# Patient Record
Sex: FEMALE | Race: WHITE | Employment: UNEMPLOYED | URBAN - METROPOLITAN AREA
[De-identification: names, ages, dates, MRNs, and addresses within clinical notes are randomized per-mention and may not be internally consistent; named-entity substitution may affect disease eponyms.]

---

## 2020-02-07 ENCOUNTER — HOSPITAL ENCOUNTER (EMERGENCY)
Facility: HOSPITAL | Age: 50
Discharge: HOME | End: 2020-02-08
Attending: EMERGENCY MEDICINE
Payer: COMMERCIAL

## 2020-02-07 ENCOUNTER — APPOINTMENT (EMERGENCY)
Dept: RADIOLOGY | Facility: HOSPITAL | Age: 50
End: 2020-02-07
Attending: EMERGENCY MEDICINE
Payer: COMMERCIAL

## 2020-02-07 VITALS
WEIGHT: 180 LBS | SYSTOLIC BLOOD PRESSURE: 93 MMHG | RESPIRATION RATE: 20 BRPM | HEART RATE: 60 BPM | HEIGHT: 62 IN | TEMPERATURE: 98.4 F | OXYGEN SATURATION: 95 % | BODY MASS INDEX: 33.13 KG/M2 | DIASTOLIC BLOOD PRESSURE: 49 MMHG

## 2020-02-07 DIAGNOSIS — S30.1XXA CONTUSION OF ABDOMINAL WALL, INITIAL ENCOUNTER: ICD-10-CM

## 2020-02-07 DIAGNOSIS — Z04.1 EXAM FOLLOWING MVC (MOTOR VEHICLE COLLISION), NO APPARENT INJURY: Primary | ICD-10-CM

## 2020-02-07 PROCEDURE — 96361 HYDRATE IV INFUSION ADD-ON: CPT

## 2020-02-07 PROCEDURE — 63600105 HC IODINE BASED CONTRAST: Performed by: EMERGENCY MEDICINE

## 2020-02-07 PROCEDURE — 3E0337Z INTRODUCTION OF ELECTROLYTIC AND WATER BALANCE SUBSTANCE INTO PERIPHERAL VEIN, PERCUTANEOUS APPROACH: ICD-10-PCS | Performed by: EMERGENCY MEDICINE

## 2020-02-07 PROCEDURE — 96374 THER/PROPH/DIAG INJ IV PUSH: CPT

## 2020-02-07 PROCEDURE — 73060 X-RAY EXAM OF HUMERUS: CPT | Mod: LT

## 2020-02-07 PROCEDURE — 3E0333Z INTRODUCTION OF ANTI-INFLAMMATORY INTO PERIPHERAL VEIN, PERCUTANEOUS APPROACH: ICD-10-PCS | Performed by: EMERGENCY MEDICINE

## 2020-02-07 PROCEDURE — 99285 EMERGENCY DEPT VISIT HI MDM: CPT | Mod: 25

## 2020-02-07 PROCEDURE — 73130 X-RAY EXAM OF HAND: CPT | Mod: LT

## 2020-02-07 PROCEDURE — 25800000 HC PHARMACY IV SOLUTIONS: Performed by: EMERGENCY MEDICINE

## 2020-02-07 PROCEDURE — 96375 TX/PRO/DX INJ NEW DRUG ADDON: CPT | Mod: 59

## 2020-02-07 PROCEDURE — 63600000 HC DRUGS/DETAIL CODE: Performed by: EMERGENCY MEDICINE

## 2020-02-07 PROCEDURE — 73090 X-RAY EXAM OF FOREARM: CPT | Mod: LT

## 2020-02-07 PROCEDURE — 3E033GC INTRODUCTION OF OTHER THERAPEUTIC SUBSTANCE INTO PERIPHERAL VEIN, PERCUTANEOUS APPROACH: ICD-10-PCS | Performed by: EMERGENCY MEDICINE

## 2020-02-07 PROCEDURE — 71260 CT THORAX DX C+: CPT

## 2020-02-07 PROCEDURE — 74177 CT ABD & PELVIS W/CONTRAST: CPT

## 2020-02-07 RX ORDER — ESCITALOPRAM OXALATE 10 MG/1
TABLET ORAL DAILY
COMMUNITY
End: 2023-02-01 | Stop reason: ALTCHOICE

## 2020-02-07 RX ORDER — SODIUM CHLORIDE 9 MG/ML
126 INJECTION, SOLUTION INTRAVENOUS CONTINUOUS
Status: DISCONTINUED | OUTPATIENT
Start: 2020-02-07 | End: 2020-02-08 | Stop reason: HOSPADM

## 2020-02-07 RX ORDER — DIPHENHYDRAMINE HCL 50 MG/ML
50 VIAL (ML) INJECTION ONCE
Status: COMPLETED | OUTPATIENT
Start: 2020-02-07 | End: 2020-02-07

## 2020-02-07 RX ADMIN — IOHEXOL 120 ML: 300 INJECTION, SOLUTION INTRAVENOUS at 23:02

## 2020-02-07 RX ADMIN — HYDROCORTISONE SODIUM SUCCINATE 100 MG: 100 INJECTION, POWDER, FOR SOLUTION INTRAMUSCULAR; INTRAVENOUS at 21:04

## 2020-02-07 RX ADMIN — SODIUM CHLORIDE 1000 ML: 9 INJECTION, SOLUTION INTRAVENOUS at 20:56

## 2020-02-07 RX ADMIN — DIPHENHYDRAMINE HYDROCHLORIDE 50 MG: 50 INJECTION INTRAMUSCULAR; INTRAVENOUS at 21:06

## 2020-02-07 SDOH — HEALTH STABILITY: MENTAL HEALTH: HOW OFTEN DO YOU HAVE A DRINK CONTAINING ALCOHOL?: MONTHLY OR LESS

## 2020-02-07 ASSESSMENT — ENCOUNTER SYMPTOMS
DIFFICULTY URINATING: 0
COUGH: 0
DYSURIA: 0
NAUSEA: 0
FACIAL SWELLING: 0
PALPITATIONS: 0
TROUBLE SWALLOWING: 0
BACK PAIN: 1
HEADACHES: 1
WHEEZING: 0
SHORTNESS OF BREATH: 1
AGITATION: 0
NUMBNESS: 0
DIAPHORESIS: 0
SEIZURES: 0
FATIGUE: 0
FEVER: 0
SPEECH DIFFICULTY: 0
LIGHT-HEADEDNESS: 1
NECK PAIN: 1
APPETITE CHANGE: 0
ABDOMINAL PAIN: 1
ACTIVITY CHANGE: 0
COLOR CHANGE: 0
SORE THROAT: 0
VOMITING: 0
WEAKNESS: 0
DIZZINESS: 0
DIARRHEA: 0
FLANK PAIN: 0
MYALGIAS: 1
CHILLS: 0

## 2020-02-08 RX ORDER — IBUPROFEN 600 MG/1
600 TABLET ORAL EVERY 6 HOURS PRN
Qty: 20 TABLET | Refills: 0 | Status: SHIPPED | OUTPATIENT
Start: 2020-02-08 | End: 2023-02-01 | Stop reason: ALTCHOICE
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
## 2023-02-01 VITALS
WEIGHT: 173 LBS | BODY MASS INDEX: 31.83 KG/M2 | HEIGHT: 62 IN | HEART RATE: 77 BPM | OXYGEN SATURATION: 97 % | RESPIRATION RATE: 18 BRPM | SYSTOLIC BLOOD PRESSURE: 147 MMHG | DIASTOLIC BLOOD PRESSURE: 85 MMHG | TEMPERATURE: 96.4 F

## 2023-02-01 LAB
ALBUMIN SERPL-MCNC: 4.4 G/DL (ref 3.4–5)
ALP SERPL-CCNC: 70 IU/L (ref 35–126)
ALT SERPL-CCNC: 19 IU/L (ref 11–54)
ANION GAP SERPL CALC-SCNC: 9 MEQ/L (ref 3–15)
AST SERPL-CCNC: 20 IU/L (ref 15–41)
BASOPHILS # BLD: 0.06 K/UL (ref 0.01–0.1)
BASOPHILS NFR BLD: 0.8 %
BILIRUB SERPL-MCNC: 1 MG/DL (ref 0.3–1.2)
BUN SERPL-MCNC: 15 MG/DL (ref 8–20)
CALCIUM SERPL-MCNC: 9.2 MG/DL (ref 8.9–10.3)
CHLORIDE SERPL-SCNC: 103 MEQ/L (ref 98–109)
CO2 SERPL-SCNC: 25 MEQ/L (ref 22–32)
CREAT SERPL-MCNC: 0.6 MG/DL (ref 0.6–1.1)
DIFFERENTIAL METHOD BLD: NORMAL
EOSINOPHIL # BLD: 0.04 K/UL (ref 0.04–0.36)
EOSINOPHIL NFR BLD: 0.5 %
ERYTHROCYTE [DISTWIDTH] IN BLOOD BY AUTOMATED COUNT: 12.8 % (ref 11.7–14.4)
GFR SERPL CREATININE-BSD FRML MDRD: >60 ML/MIN/1.73M*2
GLUCOSE SERPL-MCNC: 104 MG/DL (ref 70–99)
HCT VFR BLDCO AUTO: 41.2 % (ref 35–45)
HGB BLD-MCNC: 13.8 G/DL (ref 11.8–15.7)
IMM GRANULOCYTES # BLD AUTO: 0.02 K/UL (ref 0–0.08)
IMM GRANULOCYTES NFR BLD AUTO: 0.3 %
LYMPHOCYTES # BLD: 3.09 K/UL (ref 1.2–3.5)
LYMPHOCYTES NFR BLD: 40 %
MCH RBC QN AUTO: 29.6 PG (ref 28–33.2)
MCHC RBC AUTO-ENTMCNC: 33.5 G/DL (ref 32.2–35.5)
MCV RBC AUTO: 88.2 FL (ref 83–98)
MONOCYTES # BLD: 0.57 K/UL (ref 0.28–0.8)
MONOCYTES NFR BLD: 7.4 %
NEUTROPHILS # BLD: 3.95 K/UL (ref 1.7–7)
NEUTS SEG NFR BLD: 51 %
NRBC BLD-RTO: 0 %
PDW BLD AUTO: 9.4 FL (ref 9.4–12.3)
PLATELET # BLD AUTO: 308 K/UL (ref 150–369)
POTASSIUM SERPL-SCNC: 3.6 MEQ/L (ref 3.6–5.1)
PROT SERPL-MCNC: 7.9 G/DL (ref 6–8.2)
RBC # BLD AUTO: 4.67 M/UL (ref 3.93–5.22)
SODIUM SERPL-SCNC: 137 MEQ/L (ref 136–144)
WBC # BLD AUTO: 7.73 K/UL (ref 3.8–10.5)

## 2023-02-01 PROCEDURE — 82040 ASSAY OF SERUM ALBUMIN: CPT

## 2023-02-01 PROCEDURE — 80053 COMPREHEN METABOLIC PANEL: CPT | Performed by: EMERGENCY MEDICINE

## 2023-02-01 PROCEDURE — 99284 EMERGENCY DEPT VISIT MOD MDM: CPT

## 2023-02-01 PROCEDURE — 36415 COLL VENOUS BLD VENIPUNCTURE: CPT

## 2023-02-01 PROCEDURE — 85025 COMPLETE CBC W/AUTO DIFF WBC: CPT

## 2023-02-01 PROCEDURE — 83690 ASSAY OF LIPASE: CPT | Performed by: PHYSICIAN ASSISTANT

## 2023-02-01 PROCEDURE — 3E033NZ INTRODUCTION OF ANALGESICS, HYPNOTICS, SEDATIVES INTO PERIPHERAL VEIN, PERCUTANEOUS APPROACH: ICD-10-PCS | Performed by: EMERGENCY MEDICINE

## 2023-02-01 PROCEDURE — 81003 URINALYSIS AUTO W/O SCOPE: CPT | Performed by: EMERGENCY MEDICINE

## 2023-02-01 PROCEDURE — 3E033GC INTRODUCTION OF OTHER THERAPEUTIC SUBSTANCE INTO PERIPHERAL VEIN, PERCUTANEOUS APPROACH: ICD-10-PCS | Performed by: EMERGENCY MEDICINE

## 2023-02-01 PROCEDURE — 85025 COMPLETE CBC W/AUTO DIFF WBC: CPT | Performed by: EMERGENCY MEDICINE

## 2023-02-01 PROCEDURE — 96375 TX/PRO/DX INJ NEW DRUG ADDON: CPT | Mod: 25

## 2023-02-01 PROCEDURE — 81003 URINALYSIS AUTO W/O SCOPE: CPT

## 2023-02-01 PROCEDURE — 96374 THER/PROPH/DIAG INJ IV PUSH: CPT

## 2023-02-02 ENCOUNTER — HOSPITAL ENCOUNTER (EMERGENCY)
Facility: HOSPITAL | Age: 53
Discharge: HOME | End: 2023-02-02
Attending: EMERGENCY MEDICINE
Payer: COMMERCIAL

## 2023-02-02 ENCOUNTER — APPOINTMENT (EMERGENCY)
Dept: RADIOLOGY | Facility: HOSPITAL | Age: 53
End: 2023-02-02
Payer: COMMERCIAL

## 2023-02-02 DIAGNOSIS — K57.90 DIVERTICULOSIS: ICD-10-CM

## 2023-02-02 DIAGNOSIS — K42.9 UMBILICAL HERNIA WITHOUT OBSTRUCTION AND WITHOUT GANGRENE: ICD-10-CM

## 2023-02-02 DIAGNOSIS — R10.9 ABDOMINAL PAIN, UNSPECIFIED ABDOMINAL LOCATION: Primary | ICD-10-CM

## 2023-02-02 DIAGNOSIS — K59.00 CONSTIPATION, UNSPECIFIED CONSTIPATION TYPE: ICD-10-CM

## 2023-02-02 LAB
BILIRUB UR QL STRIP.AUTO: NEGATIVE MG/DL
CLARITY UR REFRACT.AUTO: CLEAR
COLOR UR AUTO: COLORLESS
GLUCOSE UR STRIP.AUTO-MCNC: NEGATIVE MG/DL
HGB UR QL STRIP.AUTO: NEGATIVE
KETONES UR STRIP.AUTO-MCNC: NEGATIVE MG/DL
LEUKOCYTE ESTERASE UR QL STRIP.AUTO: NEGATIVE
LIPASE SERPL-CCNC: 35 U/L (ref 20–51)
NITRITE UR QL STRIP.AUTO: NEGATIVE
PH UR STRIP.AUTO: 5.5 [PH]
PROT UR QL STRIP.AUTO: NEGATIVE
SP GR UR REFRACT.AUTO: 1.01
UROBILINOGEN UR STRIP-ACNC: 0.2 EU/DL

## 2023-02-02 PROCEDURE — 63700000 HC SELF-ADMINISTRABLE DRUG: Performed by: EMERGENCY MEDICINE

## 2023-02-02 PROCEDURE — G1004 CDSM NDSC: HCPCS

## 2023-02-02 PROCEDURE — 25800000 HC PHARMACY IV SOLUTIONS: Performed by: PHYSICIAN ASSISTANT

## 2023-02-02 PROCEDURE — 63600000 HC DRUGS/DETAIL CODE: Performed by: PHYSICIAN ASSISTANT

## 2023-02-02 PROCEDURE — 74176 CT ABD & PELVIS W/O CONTRAST: CPT | Mod: ME

## 2023-02-02 RX ORDER — DICYCLOMINE HYDROCHLORIDE 10 MG/1
10 CAPSULE ORAL ONCE
Status: COMPLETED | OUTPATIENT
Start: 2023-02-02 | End: 2023-02-02

## 2023-02-02 RX ORDER — ONDANSETRON HYDROCHLORIDE 2 MG/ML
4 INJECTION, SOLUTION INTRAVENOUS ONCE
Status: COMPLETED | OUTPATIENT
Start: 2023-02-02 | End: 2023-02-02

## 2023-02-02 RX ORDER — POLYETHYLENE GLYCOL 3350 17 G/17G
17 POWDER, FOR SOLUTION ORAL ONCE
Status: COMPLETED | OUTPATIENT
Start: 2023-02-02 | End: 2023-02-02

## 2023-02-02 RX ORDER — MORPHINE SULFATE 4 MG/ML
4 INJECTION, SOLUTION INTRAMUSCULAR; INTRAVENOUS ONCE
Status: COMPLETED | OUTPATIENT
Start: 2023-02-02 | End: 2023-02-02

## 2023-02-02 RX ADMIN — DICYCLOMINE HYDROCHLORIDE 10 MG: 10 CAPSULE ORAL at 05:15

## 2023-02-02 RX ADMIN — ONDANSETRON HYDROCHLORIDE 4 MG: 2 SOLUTION INTRAMUSCULAR; INTRAVENOUS at 03:10

## 2023-02-02 RX ADMIN — SODIUM CHLORIDE 1000 ML: 9 INJECTION, SOLUTION INTRAVENOUS at 03:10

## 2023-02-02 RX ADMIN — MORPHINE SULFATE 4 MG: 4 INJECTION, SOLUTION INTRAMUSCULAR; INTRAVENOUS at 03:10

## 2023-02-02 RX ADMIN — POLYETHYLENE GLYCOL (3350) 17 G: 17 POWDER, FOR SOLUTION ORAL at 05:15

## 2023-02-02 NOTE — DISCHARGE INSTRUCTIONS
Take OTC Miralax, as directed on the bottle, 1-2 times per day until having a large bowel movement    Make your PCP aware of incidental findings including a small splenule

## 2023-02-02 NOTE — ED ATTESTATION NOTE
Procedures  Physical Exam  Review of Systems    :26 AM  I have personally seen and examined the patient.  I personally performed the key components of the encounter and provided a substantive portion of the care and medical decision making for this patient. I reviewed and agree with the PA/NP/Resident's assessment and plan of care, with any exceptions as documented below    My focused history, examination, assessment, and plan of care of Sofia Glass is as follows:    The patient is a 52 y.o. who presents with left sided abdominal pain, now right sided in nature, severe, non radiating at this time, no associated fevers, chills, nausea or vomiting, no urinary or vaginal symptoms    Pertinent past medical history includes: pneumonia      Exam: mild tenderness to deep palpation with palpable stool burden in transverse and descending colon      Impression/Plan/Medical decision [age] year old female here with abdominal pain, subsequently underwent CT imaging to help further evaluate pain, no clear picture for source of pain without other localizing sources by system for the patient's pain, no significant diarrhea or other issues. Patient with some issues with constipation however, and CT imaging also reveals a significant stool burden, with the negative other workup, I think this most likely reflects issues with more pronounced constipation, patient subsequently given symptomatic control and a bowel regimen and is to follow up with her PCP and return with any new or worsening symptoms        Vital Signs Review: Vital signs have been reviewed. The oxygen saturation is SpO2: 97 %  which is normal.      I was physically present for the key/critical portions of the following procedures: None    This document was created using dragon dictation software.  There might be some typographical errors due to this technology.    Patients seen in waiting room secondary to significantly increased volumes, decreased  capacity and staff.      Lambert Macias MD  02/04/23 8053

## 2023-02-03 ASSESSMENT — ENCOUNTER SYMPTOMS
DYSURIA: 0
DIARRHEA: 0
FREQUENCY: 0
CONSTIPATION: 0
FEVER: 0
HEMATURIA: 0
ABDOMINAL PAIN: 1
VOMITING: 0
NAUSEA: 0
CHILLS: 0

## 2023-02-04 NOTE — ED PROVIDER NOTES
Emergency Medicine Note  HPI   HISTORY OF PRESENT ILLNESS     52-year-old female with history of pneumonia, cholecystectomy, appendectomy, kidney stones, presents to the emergency department for right-sided abdominal pain.  Patient reports several weeks ago she started with left-sided abdominal pain, then started with right-sided abdominal pain, sometimes has pain in both areas, but today the pain in the right side was severe.  Patient states she thinks she could be constipated, but did have a normal bowel movement today.  It was not black or bloody.  Denies fever, chills, nausea, vomiting, urinary symptoms.  Patient has never had pain like this before.            Patient History   PAST HISTORY     Reviewed from Nursing Triage:       Past Medical History:   Diagnosis Date   • Pneumonia        History reviewed. No pertinent surgical history.    History reviewed. No pertinent family history.    Social History     Tobacco Use   • Smoking status: Never   • Smokeless tobacco: Never   Substance Use Topics   • Alcohol use: Yes   • Drug use: Never         Review of Systems   REVIEW OF SYSTEMS     Review of Systems   Constitutional: Negative for chills and fever.   Gastrointestinal: Positive for abdominal pain. Negative for constipation, diarrhea, nausea and vomiting.   Genitourinary: Negative for dysuria, frequency, hematuria and urgency.         VITALS     ED Vitals    Date/Time Temp Pulse Resp BP SpO2 [facility]   02/01/23 2252 35.8 °C (96.4 °F) 77 18 147/85 97 % HAB                       Physical Exam   PHYSICAL EXAM     Physical Exam  Vitals and nursing note reviewed.   Constitutional:       General: She is in acute distress (Appears uncomfortable).      Appearance: Normal appearance. She is not ill-appearing, toxic-appearing or diaphoretic.   HENT:      Head: Normocephalic and atraumatic.   Cardiovascular:      Rate and Rhythm: Normal rate and regular rhythm.      Heart sounds: Normal heart sounds.   Pulmonary:      Effort:  Pulmonary effort is normal. No respiratory distress.      Breath sounds: Normal breath sounds. No stridor. No wheezing, rhonchi or rales.   Chest:      Chest wall: No tenderness.   Abdominal:      General: There is no distension.      Palpations: Abdomen is soft.      Tenderness: There is no abdominal tenderness. There is no right CVA tenderness or left CVA tenderness.   Musculoskeletal:         General: Normal range of motion.   Skin:     General: Skin is warm and dry.   Neurological:      Mental Status: She is alert and oriented to person, place, and time.   Psychiatric:         Mood and Affect: Mood normal.         Behavior: Behavior normal.           PROCEDURES     Procedures     DATA     Results     Procedure Component Value Units Date/Time    Extra Tubes [[id number]] Collected: 02/01/23 2256    Specimen: Blood, Venous Updated: 02/02/23 0701    Narrative:      The following orders were created for panel order Extra Tubes.  Procedure                               Abnormality         Status                     ---------                               -----------         ------                     Extra Tube Lt Blue[[id number]]                               Final result               Extra Tube Red[[id number]]                                   Final result               Extra Tube Lt Green[[id number]]                              Final result               Extra Tube Gold[[id number]]                                  Final result                 Please view results for these tests on the individual orders.    Extra Tube Lt Blue [[id number]] Collected: 02/01/23 2256    Specimen: Blood, Venous Updated: 02/02/23 0701    Extra Tube Red [[id number]] Collected: 02/01/23 2256    Specimen: Blood, Venous Updated: 02/02/23 0701    Extra Tube Lt Green [[id number]] Collected: 02/01/23 2256    Specimen: Blood, Venous Updated: 02/02/23 0701    Extra Tube Gold [[id number]] Collected: 02/01/23 2256    Specimen: Blood, Venous Updated:  02/02/23 0701    Lipase [[id number]]  (Normal) Collected: 02/01/23 2256    Specimen: Blood, Venous Updated: 02/02/23 0322     Lipase 35 U/L     Urinalysis with Reflex Culture [[id number]]  (Normal) Collected: 02/01/23 2358    Specimen: Urine, Clean Catch Updated: 02/02/23 0017    Narrative:      The following orders were created for panel order Urinalysis with Reflex Culture.  Procedure                               Abnormality         Status                     ---------                               -----------         ------                     UA Reflex to Culture (Ma...[[id number]]  Normal              Final result                 Please view results for these tests on the individual orders.    UA Reflex to Culture (Macroscopic) [[id number]]  (Normal) Collected: 02/01/23 2358    Specimen: Urine, Clean Catch Updated: 02/02/23 0017     Color, Urine Colorless     Clarity, Urine Clear     Specific Gravity, Urine 1.013     pH, Urine 5.5     Leukocyte Esterase Negative     Comment: Results can be falsely negative due to high specific gravity, some antibiotics, glucose >3 g/dl, or WBC other than neutrophils.        Nitrite, Urine Negative     Protein, Urine Negative     Glucose, Urine Negative mg/dL      Ketones, Urine Negative mg/dL      Urobilinogen, Urine 0.2 EU/dL      Bilirubin, Urine Negative mg/dL      Blood, Urine Negative     Comment: The sensitivity of the occult blood test is equivalent to approximately 4 intact RBC/HPF.       Comprehensive metabolic panel [[id number]]  (Abnormal) Collected: 02/01/23 2256    Specimen: Blood, Venous Updated: 02/01/23 2322     Sodium 137 mEQ/L      Potassium 3.6 mEQ/L      Comment: Results obtained on plasma. Plasma Potassium values may be up to 0.4 mEQ/L less than serum values. The differences may be greater for patients with high platelet or white cell counts.        Chloride 103 mEQ/L      CO2 25 mEQ/L      BUN 15 mg/dL      Creatinine 0.6 mg/dL      Glucose 104 mg/dL       Calcium 9.2 mg/dL      AST (SGOT) 20 IU/L      ALT (SGPT) 19 IU/L      Alkaline Phosphatase 70 IU/L      Total Protein 7.9 g/dL      Comment: Test performed on plasma which typically contains approximately 0.4 g/dL more protein than serum.        Albumin 4.4 g/dL      Bilirubin, Total 1.0 mg/dL      eGFR >60.0 mL/min/1.73m*2      Anion Gap 9 mEQ/L     CBC and differential [[id number]] Collected: 02/01/23 2256    Specimen: Blood, Venous Updated: 02/01/23 2306     WBC 7.73 K/uL      RBC 4.67 M/uL      Hemoglobin 13.8 g/dL      Hematocrit 41.2 %      MCV 88.2 fL      MCH 29.6 pg      MCHC 33.5 g/dL      RDW 12.8 %      Platelets 308 K/uL      MPV 9.4 fL      Differential Type Auto     nRBC 0.0 %      Immature Granulocytes 0.3 %      Neutrophils 51.0 %      Lymphocytes 40.0 %      Monocytes 7.4 %      Eosinophils 0.5 %      Basophils 0.8 %      Immature Granulocytes, Absolute 0.02 K/uL      Neutrophils, Absolute 3.95 K/uL      Lymphocytes, Absolute 3.09 K/uL      Monocytes, Absolute 0.57 K/uL      Eosinophils, Absolute 0.04 K/uL      Basophils, Absolute 0.06 K/uL           Imaging Results          CT ABDOMEN PELVIS WITHOUT IV CONTRAST (Final result)  Result time 02/02/23 08:47:40    Final result                 Impression:    IMPRESSION: No hydronephrosis. No hydroureter. Cholecystectomy clips.               Narrative:    CLINICAL HISTORY: RUQ Abdominal Pain  RLQ abdominal pain (Age >= 14y)    COMMENT: Serial axial images of the abdomen and pelvis without contrast with  sagittal and coronal reformats. The study is limited due to lack of contrast for  evaluation of solid organs and bowel.    COMPARISON: February 7, 2020.    CT DOSE:  One or more dose reduction techniques (e.g. automated exposure  control, adjustment of the mA and/or kV according to patient size, use of  iterative reconstruction technique) utilized for this examination.    LUNG BASES/HEART :  Normal.    LIVER//BILIARY TREE:  No acute findings.  GALL  BLADDER:Patient is status post cholecystectomy..  SPLEEN:  Normal.  PANCREAS:  No acute findings.  ADRENAL GLANDS:  Normal.  KIDNEYS:  Kidneys are normal.  GI TRACT:  There is no focal inflammatory process or mural thickening.  Diverticulosis coli present.  VESSELS:No aneurysm.  PELVIC STRUCTURES: No pelvic masses.  ADENOPATHY: None.  ASCITES: None.  OSSEOUS STRUCTURES: No acute osseous findings.. Mild degenerative changes in the  lumbar spine.  Small fat-containing umbilical hernia.                  ED Interpretation    Preliminary Impression:    Comparison was made with prior study dated 2/7/20. Exam is limited without IV contrast. Status post cholecystectomy. Small splenule No evidence for renal calculi or hydronephrosis. No evidence of bowel obstruction. The appendix is not visualized but no evidence for pericecal inflammation. Colonic diverticulosis without evidence for acute diverticulitis. Small fat-containing umbilical hernia.      Interpreted by: Mey Torre MD, Feb 02, 2023 04:13 AM                              No orders to display       Scoring tools                                  ED Course & MDM   MDM / ED COURSE / CLINICAL IMPRESSION / DISPO     MDM    ED Course as of 02/03/23 2340   Thu Feb 02, 2023   0558 Patient seen for right-sided mid abdominal pain.  UA negative.  Labs unremarkable.  CAT scan unremarkable, showing other incidental findings that were relayed to the patient.  I viewed the CAT scan personally and patient does have a moderate to large amount of stool on the right side.  Patient was originally treated with fluids, morphine, Zofran when she first got here.  She was seen by Dr. Guerra who ordered Bentyl and MiraLAX.  We will discharge patient home to continue to take MiraLAX. [MG]      ED Course User Index  [MG] Mckenzie Burns PA C     Clinical Impression      Abdominal pain, unspecified abdominal location   Constipation, unspecified constipation type   Diverticulosis    Umbilical hernia without obstruction and without gangrene     _________________     ED Disposition   Discharge                   Mckenzie Burns PA C  02/03/23 2245

## 2023-06-20 ENCOUNTER — TRANSCRIBE ORDERS (OUTPATIENT)
Dept: SCHEDULING | Age: 53
End: 2023-06-20

## 2023-06-20 DIAGNOSIS — S92.002A UNSPECIFIED FRACTURE OF LEFT CALCANEUS, INITIAL ENCOUNTER FOR CLOSED FRACTURE: Primary | ICD-10-CM

## 2023-06-30 ENCOUNTER — HOSPITAL ENCOUNTER (OUTPATIENT)
Dept: RADIOLOGY | Facility: HOSPITAL | Age: 53
Discharge: HOME | End: 2023-06-30
Attending: PODIATRIST
Payer: COMMERCIAL

## 2023-06-30 DIAGNOSIS — S92.002A UNSPECIFIED FRACTURE OF LEFT CALCANEUS, INITIAL ENCOUNTER FOR CLOSED FRACTURE: ICD-10-CM

## 2023-09-14 ENCOUNTER — APPOINTMENT (OUTPATIENT)
Dept: PREADMISSION TESTING | Facility: HOSPITAL | Age: 53
End: 2023-09-14
Payer: COMMERCIAL

## 2023-09-14 VITALS — HEIGHT: 61 IN | BODY MASS INDEX: 32.1 KG/M2 | WEIGHT: 170 LBS

## 2023-09-14 RX ORDER — CETIRIZINE HYDROCHLORIDE 10 MG/1
10 TABLET ORAL DAILY PRN
COMMUNITY
Start: 2023-09-02

## 2023-09-14 RX ORDER — DIPHENHYDRAMINE HCL 25 MG
25 CAPSULE ORAL NIGHTLY PRN
COMMUNITY

## 2023-09-14 RX ORDER — LORAZEPAM 1 MG/1
1 TABLET ORAL EVERY 6 HOURS PRN
COMMUNITY

## 2023-09-14 RX ORDER — TRAMADOL HYDROCHLORIDE 50 MG/1
50 TABLET ORAL EVERY 12 HOURS PRN
COMMUNITY
Start: 2023-08-09

## 2023-09-14 RX ORDER — MELOXICAM 15 MG/1
15 TABLET ORAL AS NEEDED
COMMUNITY
Start: 2022-06-02

## 2023-09-14 RX ORDER — ALBUTEROL SULFATE 90 UG/1
2 INHALANT RESPIRATORY (INHALATION) EVERY 4 HOURS PRN
COMMUNITY
Start: 2023-06-30

## 2023-09-14 RX ORDER — EZETIMIBE 10 MG/1
10 TABLET ORAL DAILY
COMMUNITY
Start: 2023-09-05 | End: 2023-12-04

## 2023-09-14 ASSESSMENT — PAIN SCALES - GENERAL: PAINLEVEL: 3

## 2023-09-14 NOTE — PRE-PROCEDURE INSTRUCTIONS
1. Admissions will call you with your arrival time on 9/20 (day prior to surgery) between 2pm-4pm.  For questions about your arrival time, please call [phone].     2. On the day of your procedure please report to the [facility] in the [place]. Please arrive through the [facility].  If you are parking in the [facility] [place] [facility], come to the ground floor of the garage and follow signs to the [facility].  After being screened, please report to either the Outpatient Registration for Pre-Admission Testing or to the Surgery Registration Desk.    3. Please follow the following fasting guidelines:     No solid food EIGHT HOURS prior to arrival time on day of surgery.  Unlimited clear liquids, meaning water or PLAIN black coffee WITHOUT any milk, cream, sugar, or sweetener are permitted up to TWO HOURS prior to arrival at the hospital.    4. Please take ONLY the following medications with a sip of water on the morning of your procedure: (populate names and/or NONE)  Bring in Albuterol, Ativan if needed    5. Other Instructions: You may brush your teeth the morning of the procedure. Rinse and spit, do not swallow.  Bring a list of your medications with dosages.  Use surgical wash as directed.     6. If you develop a cold, cough, fever, rash, or other symptom prior to the day of the procedure, please report it to your physician immediately.    7. If you need to cancel the procedure for any reason, please contact your physician.    8. Make arrangements to have safe transportation home accompanied by a responsible adult. If you have not arranged safe transportation home, your surgery will be cancelled. Safe transportation may include private vehicle, ride-share service, taxi and public transportation when accompanied by a responsible adult who will assist you home. A responsible adult is someone known to you and does not include the taxi, ride-share or public transit drive transporting  you.    9.  If it is medically necessary for you to have a longer stay, you will be informed as soon as the decision is made.    10. Only bring essential items to the hospital.  Do not wear or bring anything of value to the hospital including jewelry of any kind, money, or wallet. Do not wear make-up or contact lenses.  DO NOT BRING MEDICATIONS FROM HOME unless instructed to do so. DO bring your hearing aids, glasses, and a case    11. No lotion, creams, powders, or oils on skin the morning of procedure     12. Dress in comfortable clothes.    13.  If instructed, please bring a copy of your Advanced Directive (Living Will/Durable Power of ) on the day of your procedure.     14. Ensuring your safety at all times is a very important part of our [facility] Culture of Safety. After having surgery and sedation, you are at risk for falling and balance issues. Although you may feel awake, the effects of the medication can last up to 24 hours after anesthesia. If you need to use the bathroom during your recovery period, nursing staff will escort you there and stay with you to ensure your safety.    15. Refrain from drinking alcohol and smoking cigarettes for 24 hours prior to surgery.    16. Shower with antibacterial soap (Dial) the night before and morning of your procedure.  If your procedure indicates the need for CHG antiseptic wash (Bactoshield or Hibiclens), please use this instead and follow instructions as discussed at the time of your Pre-Admission Testing phone interview or visit.    Above instructions reviewed with patient and patient acknowledges understanding.    Form explained by: Kathy Ely RN

## 2023-09-21 ENCOUNTER — HOSPITAL ENCOUNTER (OUTPATIENT)
Facility: HOSPITAL | Age: 53
Setting detail: HOSPITAL OUTPATIENT SURGERY
Discharge: HOME | End: 2023-09-21
Attending: PODIATRIST | Admitting: PODIATRIST
Payer: COMMERCIAL

## 2023-09-21 ENCOUNTER — ANESTHESIA (OUTPATIENT)
Dept: OPERATING ROOM | Facility: HOSPITAL | Age: 53
Setting detail: HOSPITAL OUTPATIENT SURGERY
End: 2023-09-21
Payer: COMMERCIAL

## 2023-09-21 VITALS
OXYGEN SATURATION: 99 % | HEART RATE: 60 BPM | RESPIRATION RATE: 17 BRPM | TEMPERATURE: 97.2 F | SYSTOLIC BLOOD PRESSURE: 106 MMHG | DIASTOLIC BLOOD PRESSURE: 64 MMHG

## 2023-09-21 PROCEDURE — 63700000 HC SELF-ADMINISTRABLE DRUG

## 2023-09-21 PROCEDURE — 71000012 HC PACU PHASE 2 EA ADDL MIN: Performed by: PODIATRIST

## 2023-09-21 PROCEDURE — 25000000 HC PHARMACY GENERAL: Performed by: REGISTERED NURSE

## 2023-09-21 PROCEDURE — 63600000 HC DRUGS/DETAIL CODE: Mod: JZ | Performed by: REGISTERED NURSE

## 2023-09-21 PROCEDURE — 36000013 HC OR LEVEL 3 EA ADDL MIN: Performed by: PODIATRIST

## 2023-09-21 PROCEDURE — 25000000 HC PHARMACY GENERAL: Performed by: PODIATRIST

## 2023-09-21 PROCEDURE — 0J8R0ZZ DIVISION OF LEFT FOOT SUBCUTANEOUS TISSUE AND FASCIA, OPEN APPROACH: ICD-10-PCS | Performed by: PODIATRIST

## 2023-09-21 PROCEDURE — 36000003 HC OR LEVEL 3 INITIAL 30MIN: Performed by: PODIATRIST

## 2023-09-21 PROCEDURE — 25800000 HC PHARMACY IV SOLUTIONS: Performed by: REGISTERED NURSE

## 2023-09-21 PROCEDURE — 27200000 HC STERILE SUPPLY: Performed by: PODIATRIST

## 2023-09-21 PROCEDURE — 37000001 HC ANESTHESIA GENERAL: Performed by: PODIATRIST

## 2023-09-21 PROCEDURE — 63600000 HC DRUGS/DETAIL CODE: Performed by: REGISTERED NURSE

## 2023-09-21 PROCEDURE — 71000002 HC PACU PHASE 2 INITIAL 30MIN: Performed by: PODIATRIST

## 2023-09-21 PROCEDURE — 71000001 HC PACU PHASE 1 INITIAL 30MIN: Performed by: PODIATRIST

## 2023-09-21 PROCEDURE — 63600000 HC DRUGS/DETAIL CODE: Mod: JZ | Performed by: PODIATRIST

## 2023-09-21 PROCEDURE — 63600000 HC DRUGS/DETAIL CODE: Mod: JZ

## 2023-09-21 PROCEDURE — 71000011 HC PACU PHASE 1 EA ADDL MIN: Performed by: PODIATRIST

## 2023-09-21 RX ORDER — FENTANYL CITRATE 50 UG/ML
INJECTION, SOLUTION INTRAMUSCULAR; INTRAVENOUS AS NEEDED
Status: DISCONTINUED | OUTPATIENT
Start: 2023-09-21 | End: 2023-09-21 | Stop reason: SURG

## 2023-09-21 RX ORDER — FENTANYL CITRATE 50 UG/ML
50 INJECTION, SOLUTION INTRAMUSCULAR; INTRAVENOUS EVERY 5 MIN PRN
Status: DISCONTINUED | OUTPATIENT
Start: 2023-09-21 | End: 2023-09-21 | Stop reason: HOSPADM

## 2023-09-21 RX ORDER — LIDOCAINE HYDROCHLORIDE 10 MG/ML
INJECTION, SOLUTION EPIDURAL; INFILTRATION; INTRACAUDAL; PERINEURAL AS NEEDED
Status: DISCONTINUED | OUTPATIENT
Start: 2023-09-21 | End: 2023-09-21 | Stop reason: SURG

## 2023-09-21 RX ORDER — IBUPROFEN 200 MG
16-32 TABLET ORAL AS NEEDED
Status: DISCONTINUED | OUTPATIENT
Start: 2023-09-21 | End: 2023-09-21 | Stop reason: HOSPADM

## 2023-09-21 RX ORDER — SODIUM CHLORIDE 9 MG/ML
INJECTION, SOLUTION INTRAVENOUS CONTINUOUS PRN
Status: DISCONTINUED | OUTPATIENT
Start: 2023-09-21 | End: 2023-09-21 | Stop reason: SURG

## 2023-09-21 RX ORDER — KETOROLAC TROMETHAMINE 15 MG/ML
15 INJECTION, SOLUTION INTRAMUSCULAR; INTRAVENOUS ONCE AS NEEDED
Status: CANCELLED | OUTPATIENT
Start: 2023-09-21 | End: 2023-09-22

## 2023-09-21 RX ORDER — BUPIVACAINE HYDROCHLORIDE 5 MG/ML
INJECTION, SOLUTION EPIDURAL; INTRACAUDAL
Status: DISCONTINUED | OUTPATIENT
Start: 2023-09-21 | End: 2023-09-21 | Stop reason: HOSPADM

## 2023-09-21 RX ORDER — DEXAMETHASONE SODIUM PHOSPHATE 4 MG/ML
INJECTION, SOLUTION INTRA-ARTICULAR; INTRALESIONAL; INTRAMUSCULAR; INTRAVENOUS; SOFT TISSUE
Status: DISCONTINUED | OUTPATIENT
Start: 2023-09-21 | End: 2023-09-21 | Stop reason: HOSPADM

## 2023-09-21 RX ORDER — DEXTROSE 40 %
15-30 GEL (GRAM) ORAL AS NEEDED
Status: DISCONTINUED | OUTPATIENT
Start: 2023-09-21 | End: 2023-09-21 | Stop reason: HOSPADM

## 2023-09-21 RX ORDER — HYDROCODONE BITARTRATE AND ACETAMINOPHEN 5; 325 MG/1; MG/1
1 TABLET ORAL EVERY 6 HOURS PRN
Qty: 10 TABLET | Refills: 0 | Status: SHIPPED | OUTPATIENT
Start: 2023-09-21 | End: 2024-01-04 | Stop reason: ALTCHOICE

## 2023-09-21 RX ORDER — HYDROCODONE BITARTRATE AND ACETAMINOPHEN 5; 325 MG/1; MG/1
1 TABLET ORAL ONCE AS NEEDED
Status: COMPLETED | OUTPATIENT
Start: 2023-09-21 | End: 2023-09-21

## 2023-09-21 RX ORDER — EPHEDRINE SULFATE 50 MG/ML
INJECTION, SOLUTION INTRAVENOUS AS NEEDED
Status: DISCONTINUED | OUTPATIENT
Start: 2023-09-21 | End: 2023-09-21 | Stop reason: SURG

## 2023-09-21 RX ORDER — HYDROCODONE BITARTRATE AND ACETAMINOPHEN 5; 325 MG/1; MG/1
TABLET ORAL
Status: COMPLETED
Start: 2023-09-21 | End: 2023-09-21

## 2023-09-21 RX ORDER — DEXTROSE 50 % IN WATER (D50W) INTRAVENOUS SYRINGE
25 AS NEEDED
Status: DISCONTINUED | OUTPATIENT
Start: 2023-09-21 | End: 2023-09-21 | Stop reason: HOSPADM

## 2023-09-21 RX ORDER — MELOXICAM 7.5 MG/1
7.5 TABLET ORAL DAILY
Qty: 30 TABLET | Refills: 0 | Status: SHIPPED | OUTPATIENT
Start: 2023-09-21 | End: 2023-10-21

## 2023-09-21 RX ORDER — ONDANSETRON HYDROCHLORIDE 2 MG/ML
4 INJECTION, SOLUTION INTRAVENOUS
Status: DISCONTINUED | OUTPATIENT
Start: 2023-09-21 | End: 2023-09-21 | Stop reason: HOSPADM

## 2023-09-21 RX ORDER — ONDANSETRON HYDROCHLORIDE 2 MG/ML
INJECTION, SOLUTION INTRAVENOUS AS NEEDED
Status: DISCONTINUED | OUTPATIENT
Start: 2023-09-21 | End: 2023-09-21 | Stop reason: SURG

## 2023-09-21 RX ORDER — PROPOFOL 10 MG/ML
INJECTION, EMULSION INTRAVENOUS AS NEEDED
Status: DISCONTINUED | OUTPATIENT
Start: 2023-09-21 | End: 2023-09-21 | Stop reason: SURG

## 2023-09-21 RX ORDER — MIDAZOLAM HYDROCHLORIDE 2 MG/2ML
INJECTION, SOLUTION INTRAMUSCULAR; INTRAVENOUS AS NEEDED
Status: DISCONTINUED | OUTPATIENT
Start: 2023-09-21 | End: 2023-09-21 | Stop reason: SURG

## 2023-09-21 RX ADMIN — PROPOFOL 200 MG: 10 INJECTION, EMULSION INTRAVENOUS at 07:45

## 2023-09-21 RX ADMIN — EPHEDRINE SULFATE 10 MG: 50 INJECTION, SOLUTION INTRAVENOUS at 08:07

## 2023-09-21 RX ADMIN — FENTANYL CITRATE 25 MCG: 50 INJECTION INTRAMUSCULAR; INTRAVENOUS at 07:50

## 2023-09-21 RX ADMIN — ONDANSETRON 4 MG: 2 INJECTION INTRAMUSCULAR; INTRAVENOUS at 08:22

## 2023-09-21 RX ADMIN — FENTANYL CITRATE 50 MCG: 50 INJECTION INTRAMUSCULAR; INTRAVENOUS at 08:39

## 2023-09-21 RX ADMIN — FENTANYL CITRATE 50 MCG: 50 INJECTION INTRAMUSCULAR; INTRAVENOUS at 07:45

## 2023-09-21 RX ADMIN — SODIUM CHLORIDE: 0.9 INJECTION, SOLUTION INTRAVENOUS at 07:37

## 2023-09-21 RX ADMIN — MIDAZOLAM HYDROCHLORIDE 2 MG: 1 INJECTION, SOLUTION INTRAMUSCULAR; INTRAVENOUS at 07:36

## 2023-09-21 RX ADMIN — EPHEDRINE SULFATE 10 MG: 50 INJECTION, SOLUTION INTRAVENOUS at 08:00

## 2023-09-21 RX ADMIN — PROPOFOL 50 MG: 10 INJECTION, EMULSION INTRAVENOUS at 08:39

## 2023-09-21 RX ADMIN — CEFAZOLIN 2 G: 2 INJECTION, POWDER, FOR SOLUTION INTRAMUSCULAR; INTRAVENOUS at 07:50

## 2023-09-21 RX ADMIN — DEXAMETHASONE SODIUM PHOSPHATE 4 MG: 4 INJECTION, SOLUTION INTRA-ARTICULAR; INTRALESIONAL; INTRAMUSCULAR; INTRAVENOUS; SOFT TISSUE at 08:22

## 2023-09-21 RX ADMIN — LIDOCAINE HYDROCHLORIDE 10 ML: 10 INJECTION, SOLUTION EPIDURAL; INFILTRATION; INTRACAUDAL; PERINEURAL at 07:45

## 2023-09-21 RX ADMIN — HYDROCODONE BITARTRATE AND ACETAMINOPHEN 1 TABLET: 5; 325 TABLET ORAL at 11:50

## 2023-09-21 ASSESSMENT — PAIN SCALES - GENERAL: PAIN_LEVEL: 2

## 2023-09-21 NOTE — ANESTHESIOLOGIST PRE-PROCEDURE ATTESTATION
Pre-Procedure Patient Identification:  I am the Primary Anesthesiologist and have identified the patient on 09/21/23 at 7:18 AM.   I have confirmed the procedure(s) will be performed by the following surgeon/proceduralist Chuck Capps DPM.

## 2023-09-21 NOTE — OR SURGEON
Pre-Procedure patient identification:  I am the primary operating surgeon/proceduralist and I have reviewed the applicable pathology reports and radiology studies for this procedure. I have identified the patient and confirmed laterality is left on 09/21/23 at 7:08 AM Chuck Capps DPM  Phone Number: [phone]

## 2023-09-21 NOTE — ANESTHESIA PROCEDURE NOTES
Airway  Start Time: 9/21/2023 7:46 AM    General Information and Staff    Resident/CRNA: Nicholas Vazquez CRNA  Performed by: Nicholas Vazquez CRNA  Authorized by: Ramsey Hinds DO      Indications and Patient Condition  Indications for airway management: anesthesia  Sedation level: general  Preoxygenated: yes  MILS not maintained throughout  Mask difficulty assessment: 0 - not attempted    Final Airway Details  Final airway type: supraglottic airway      Successful airway: iGel  Size 4     Number of attempts at approach: 1  Ventilation between attempts: none  Number of other approaches attempted: 0  Atraumatic airway insertion

## 2023-09-21 NOTE — OP NOTE
Blank Op Note    9/21/2023  Hospital: [facility]  Medical Record Number:  [id number]  Patient Name:  Sofia Glass  YOB: 1970   Date of Operation:  9/21/2023  Primary Surgeon:  Chuck Capps DPM  First Assistant: Edy Carlos DPM    Preoperative Diagnosis: Left foot plantar fasciitis    Postoperative Diagnosis: Same as above    Procedure:Procedure(s):  Left Endoscopic Fasciototomy with cortisone injection    Anesthesia: General , 30 cc of 0.5% marcaine plain      Hemostasis: Pneumatic Ankle Tourniquet for approximately 43 minutes, anatomical dissection    Operative Findings: Thickening and scarring at the plantar fascia    Estimated Blood Loss: Minimal    Specimens: None    Implants: * No implants in log *    Injectables: None         Complications:  None; patient tolerated the procedure well.           Disposition: PACU - hemodynamically stable.           Condition: stable    Operative Note:   Summary: Patient presents to [facility] for surgical intervention of her left foot. Pt has failed conservative modalities and has opted for surgical intervention at this time. In pre-op the patients vital signs are stable and neurovascular status is intact.     The patient was transferred to the operating room and placed on the operating room table in the supine position.  The correct surgical site was identified which is the left  foot. A well padded pneumatic calf tourniquet was then placed around the left calf but not elevated at this time. Once anesthesia was achieved, the above mentioned blocked was administered. The foot and ankle were sterilely prepped and draped in the usual aseptic technique and a timeout was performed before the beginning of the procedure. The Esmarch bandage was wrapped around the left foot and ankle and the tourniquet was then elevated to 250 mmHg.    Procedure: Left foot partial endoscopic plantar fasciotomy    Attention was drawn towards the medial heel.  An  incision measuring approximately 2 cm was made just at to the glabrous junction and distal to the calcaneal plantar medial tubercle using a #15 blade down to subcutaneous tissue.  The incision was made also around where the most intense pain was present.  A hemostat used for blunt dissection down to fascia.  Elevator used to make a canal between plantar fascia and surrounding soft tissue, fat.  Cannula was introduced into the canal.  Scope was adjusted to proper settings.  Scope was inserted into cannula where soft tissue was visualized.  Plantar fascia and surrounding fat were clearly visualized.  Medial band of plantar fascia was measured to be 37mm.  A blocking mechanism was placed on the endoscopic blade at 7 mm to ensure only 30 mm of medial band of plantar fascia was cut.  Endoscopic scalpel was introduced into the canal and used to cut fascial bands while foot was fully dorsiflexed and loaded.  Scope was reintroduced to visualize cut plantar fascia and underlying flexor digitorum brevis muscle belly which is now exposed to the camera.  Metzenbaum used to cut any remaining fascial bands.  Scope was reintroduced and plantar fascia was inspected with 30 and millimeters of the fascia found to be fully cut.  Foot was put through range of motion.  Incision was copiously irrigated with sterile saline.  Incision was closed using 3-0 Monocryl, 4-0 nylon.    Steroid injection consisting of 1 cc of Dex phosphate was injected into the surgical site       The tourniquet was deflated and prompt hyperemic response was noted.  Pedal pulses were found to be palpable and normal.    The patient tolerated the procedure well with all vital signs stable and neurovascular status intact. Once aroused from anesthesia the patient was transferred from the operating room to PACU and discharged home per protocol.        Chuck Capps DPM  Phone Number: [phone]

## 2023-09-21 NOTE — ANESTHESIA PREPROCEDURE EVALUATION
Relevant Problems   No relevant active problems       Anesthesia ROS/MED HX    Anesthesia History    Previous anesthetics  No history of anesthetic complications  Pulmonary    asthma   Sleep apnea  Hematological no anemia  Renal Diseaseno electrolyte problem  Endo/Other  Body Habitus: Overweight       Past Surgical History:   Procedure Laterality Date    APPENDECTOMY       SECTION      x3    CHOLECYSTECTOMY         Physical Exam    Airway   Mallampati: II   TM distance: >3 FB   Neck ROM: full  Cardiovascular - normal   Rhythm: regular   Rate: normalPulmonary - normal   clear to auscultation  Dental    Teeth Problems: missing        Anesthesia Plan    Plan: general    Technique: general LMA     Lines and Monitors: PIV   2 ASA  Blood Products:   Use of Blood Products Discussed: No   Anesthetic plan and risks discussed with: patient  Induction:    intravenous   Postop Plan:   Patient Disposition: phase II then home   Pain Management: IV analgesics

## 2023-09-21 NOTE — PERIOPERATIVE NURSING NOTE
Patient AAOx3. Patient denies nausea/vomiting able to tolerate PO fluids and crackers. Patient reports pain is tolerable at 4/10. VSS on room air. Dressings CDI with post-op shoe intact. Walker teaching provided to patient and walker fitted to patient. Reviewed discharge instructions including pain medication regimen with patient - all questions answered.

## 2023-09-21 NOTE — ANESTHESIA POSTPROCEDURE EVALUATION
Patient: Sofia Glass    Procedure Summary     Date: 09/21/23 Room / Location: [facility] OR  / [facility] OR [facility]    Anesthesia Start: 0737 Anesthesia Stop: 0914    Procedure: Left Endoscopic Fasciototomy (Left: Foot) Diagnosis:       Plantar fasciitis      (Plantar fasciitis [M72.2])    Surgeons: Chuck Capps DPM Responsible Provider: Ramsey Hinds DO    Anesthesia Type: general ASA Status: 2          Anesthesia Type: general  PACU Vitals  9/21/2023 0905 - 9/21/2023 1005      9/21/2023  0911 9/21/2023  0915 9/21/2023  0930 9/21/2023  0945    BP: 122/63 124/66 122/63 111/59    Temp: 35.7 °C (96.3 °F) -- -- --    Pulse: 70 68 64 66    Resp: 20 17 19 15    SpO2: 98 % 99 % 98 % 97 %              9/21/2023  1000             BP: 100/58       Temp: --       Pulse: 60       Resp: 18       SpO2: 95 %               Anesthesia Post Evaluation    Pain score: 2  Pain management: adequate  Mode of pain management: IV medication  Patient location during evaluation: PACU  Patient participation: complete - patient participated  Level of consciousness: awake and alert  Cardiovascular status: acceptable  Airway Patency: adequate  Respiratory status: acceptable  Hydration status: acceptable  Anesthetic complications: no

## 2023-09-21 NOTE — DISCHARGE INSTRUCTIONS
[facility]  [facility] [address] [place] [address]  [phone]  Dr. Chuck Rubalcava    Post Operative Instructions    Keep foot elevated as much as possible above the level of the heart.    Keep bandage clean and dry.  Do not remove unless instructed to do so by your Physician.  If bandage becomes soiled or wet call the office immediately.    Apply ice pack to _Left foot  for 20 minutes 3-5 times a day unless otherwise advised.    Take mediation as prescribed:    Narco 5-325 mg take 1 tablet every 6 hr as needed    Meloxicam 7.5mg take 1 -2 tablet every 6 hrs as needed       Meloxicam    Bear weight on the area only as advised:  __x_full weight bearing in a surgical shoe,  you should use your CAM walker as much as possible      Should you have excessive bleeding (through the bandage) or excessive discomfort, please call the office immediately.    Please call the office number above: to set up your 1st follow up appointment.

## 2023-12-04 ENCOUNTER — OFFICE VISIT (OUTPATIENT)
Dept: NEUROLOGY | Facility: CLINIC | Age: 53
End: 2023-12-04
Payer: COMMERCIAL

## 2023-12-04 DIAGNOSIS — R52 PAIN: Primary | ICD-10-CM

## 2023-12-04 DIAGNOSIS — R20.0 NUMBNESS: ICD-10-CM

## 2023-12-04 DIAGNOSIS — R53.1 WEAKNESS: ICD-10-CM

## 2023-12-04 PROCEDURE — 95886 MUSC TEST DONE W/N TEST COMP: CPT | Performed by: PSYCHIATRY & NEUROLOGY

## 2023-12-04 PROCEDURE — 95911 NRV CNDJ TEST 9-10 STUDIES: CPT | Performed by: PSYCHIATRY & NEUROLOGY

## 2023-12-04 PROCEDURE — 99999 PR OFFICE/OUTPT VISIT,PROCEDURE ONLY: CPT | Performed by: PSYCHIATRY & NEUROLOGY

## 2023-12-04 NOTE — PROCEDURES
Sofia Glass  1970  12/4/2023  Virgie Russell MD      Clinical History:  Sofia Glass is a 53-year-old who was diagnosed with left plantar fasciitis in the spring 2023.  She had surgery in September and reports pain and new numbness and weakness in the left foot traveling proximally to the knee.  She just started to experience right foot pain.    Nerve Conduction Studies:  Bilateral sural, superficial peroneal and plantar sensory studies were normal.    Bilateral peroneal and tibial motor studies were normal.    Needle EMG:  Needle EMG of select muscles of the left L2-S1 myotomes was performed.  All muscles studied were normal; there was no evidence of acute or chronic denervation.  Low firing rates were seen in several muscles.    Impression:  This was a normal study of the legs.  There was no electrophysiologic evidence of a lumbosacral radiculopathy or plexopathy, focal neuropathy, polyneuropathy or other neuromuscular disorder at this time.    Low firing rates were seen in several muscles as may be seen in pain, and upper motor neuron disorder or poor patient effort.      Thank you, and please call with any questions.          Sincerely,  Cristobal Kirk M.D.

## 2023-12-04 NOTE — LETTER
December 4, 2023     Virgie Russell MD  [address]  [facility] [address]    Patient: Sofia Glass  YOB: 1970  Date of Visit: 12/4/2023      Dear Dr. Russell:    Thank you for referring Sofia Glass to me for evaluation. Below are my notes for this consultation.    If you have questions, please do not hesitate to call me. I look forward to following your patient along with you.         Sincerely,        Cristobal Kirk MD        CC: GINI Hunter Lucas Z, MD  12/4/2023  7:37 AM  Signed  Sofia Glass  1970  12/4/2023  Virgie Russell MD      Clinical History:  Sofia Glass is a 53-year-old who was diagnosed with left plantar fasciitis in the spring 2023.  She had surgery in September and reports pain and new numbness and weakness in the left foot traveling proximally to the knee.  She just started to experience right foot pain.    Nerve Conduction Studies:  Bilateral sural, superficial peroneal and plantar sensory studies were normal.    Bilateral peroneal and tibial motor studies were normal.    Needle EMG:  Needle EMG of select muscles of the left L2-S1 myotomes was performed.  All muscles studied were normal; there was no evidence of acute or chronic denervation.  Low firing rates were seen in several muscles.    Impression:  This was a normal study of the legs.  There was no electrophysiologic evidence of a lumbosacral radiculopathy or plexopathy, focal neuropathy, polyneuropathy or other neuromuscular disorder at this time.    Low firing rates were seen in several muscles as may be seen in pain, and upper motor neuron disorder or poor patient effort.      Thank you, and please call with any questions.          Sincerely,  Cristobal Kirk M.D.

## 2023-12-11 ENCOUNTER — TRANSCRIBE ORDERS (OUTPATIENT)
Dept: SCHEDULING | Age: 53
End: 2023-12-11

## 2023-12-11 DIAGNOSIS — S92.002A UNSPECIFIED FRACTURE OF LEFT CALCANEUS, INITIAL ENCOUNTER FOR CLOSED FRACTURE: Primary | ICD-10-CM

## 2023-12-18 ENCOUNTER — HOSPITAL ENCOUNTER (OUTPATIENT)
Dept: RADIOLOGY | Facility: HOSPITAL | Age: 53
Discharge: HOME | End: 2023-12-18
Attending: PODIATRIST
Payer: COMMERCIAL

## 2023-12-18 DIAGNOSIS — S92.002A UNSPECIFIED FRACTURE OF LEFT CALCANEUS, INITIAL ENCOUNTER FOR CLOSED FRACTURE: ICD-10-CM

## 2024-01-04 ENCOUNTER — HOSPITAL ENCOUNTER (EMERGENCY)
Facility: HOSPITAL | Age: 54
Discharge: HOME | End: 2024-01-04
Attending: FAMILY MEDICINE
Payer: COMMERCIAL

## 2024-01-04 ENCOUNTER — APPOINTMENT (EMERGENCY)
Dept: RADIOLOGY | Facility: HOSPITAL | Age: 54
End: 2024-01-04
Payer: COMMERCIAL

## 2024-01-04 VITALS
HEIGHT: 62 IN | SYSTOLIC BLOOD PRESSURE: 127 MMHG | DIASTOLIC BLOOD PRESSURE: 65 MMHG | OXYGEN SATURATION: 98 % | TEMPERATURE: 97.8 F | HEART RATE: 60 BPM | WEIGHT: 185 LBS | RESPIRATION RATE: 18 BRPM | BODY MASS INDEX: 34.04 KG/M2

## 2024-01-04 DIAGNOSIS — R10.9 ABDOMINAL PAIN, UNSPECIFIED ABDOMINAL LOCATION: Primary | ICD-10-CM

## 2024-01-04 LAB
ALBUMIN SERPL-MCNC: 4.8 G/DL (ref 3.5–5.7)
ALP SERPL-CCNC: 62 IU/L (ref 34–125)
ALT SERPL-CCNC: 22 IU/L (ref 7–52)
ANION GAP SERPL CALC-SCNC: 8 MEQ/L (ref 3–15)
AST SERPL-CCNC: 26 IU/L (ref 13–39)
BASOPHILS # BLD: 0.07 K/UL (ref 0.01–0.1)
BASOPHILS NFR BLD: 1 %
BILIRUB SERPL-MCNC: 1.2 MG/DL (ref 0.3–1.2)
BILIRUB UR QL STRIP.AUTO: NEGATIVE MG/DL
BUN SERPL-MCNC: 11 MG/DL (ref 7–25)
CALCIUM SERPL-MCNC: 9.8 MG/DL (ref 8.6–10.3)
CHLORIDE SERPL-SCNC: 105 MEQ/L (ref 98–107)
CLARITY UR REFRACT.AUTO: CLEAR
CO2 SERPL-SCNC: 27 MEQ/L (ref 21–31)
COLOR UR AUTO: YELLOW
CREAT SERPL-MCNC: 0.5 MG/DL (ref 0.6–1.2)
DIFFERENTIAL METHOD BLD: NORMAL
EGFRCR SERPLBLD CKD-EPI 2021: >60 ML/MIN/1.73M*2
EOSINOPHIL # BLD: 0.07 K/UL (ref 0.04–0.36)
EOSINOPHIL NFR BLD: 1 %
ERYTHROCYTE [DISTWIDTH] IN BLOOD BY AUTOMATED COUNT: 13 % (ref 11.7–14.4)
FLUAV RNA SPEC QL NAA+PROBE: NEGATIVE
FLUBV RNA SPEC QL NAA+PROBE: NEGATIVE
GLUCOSE SERPL-MCNC: 107 MG/DL (ref 70–99)
GLUCOSE UR STRIP.AUTO-MCNC: NEGATIVE MG/DL
HCT VFR BLDCO AUTO: 42.3 % (ref 35–45)
HGB BLD-MCNC: 14.2 G/DL (ref 11.8–15.7)
HGB UR QL STRIP.AUTO: NEGATIVE
IMM GRANULOCYTES # BLD AUTO: 0.01 K/UL (ref 0–0.08)
IMM GRANULOCYTES NFR BLD AUTO: 0.1 %
KETONES UR STRIP.AUTO-MCNC: ABNORMAL MG/DL
LEUKOCYTE ESTERASE UR QL STRIP.AUTO: NEGATIVE
LYMPHOCYTES # BLD: 1.91 K/UL (ref 1.2–3.5)
LYMPHOCYTES NFR BLD: 28.6 %
MCH RBC QN AUTO: 29.6 PG (ref 28–33.2)
MCHC RBC AUTO-ENTMCNC: 33.6 G/DL (ref 32.2–35.5)
MCV RBC AUTO: 88.3 FL (ref 83–98)
MONOCYTES # BLD: 0.45 K/UL (ref 0.28–0.8)
MONOCYTES NFR BLD: 6.7 %
NEUTROPHILS # BLD: 4.17 K/UL (ref 1.7–7)
NEUTS SEG NFR BLD: 62.6 %
NITRITE UR QL STRIP.AUTO: NEGATIVE
NRBC BLD-RTO: 0 %
PDW BLD AUTO: 9.8 FL (ref 9.4–12.3)
PH UR STRIP.AUTO: 6.5 [PH]
PLATELET # BLD AUTO: 307 K/UL (ref 150–369)
POTASSIUM SERPL-SCNC: 3.9 MEQ/L (ref 3.5–5.1)
PROT SERPL-MCNC: 8 G/DL (ref 6–8.2)
PROT UR QL STRIP.AUTO: NEGATIVE
RBC # BLD AUTO: 4.79 M/UL (ref 3.93–5.22)
RSV RNA SPEC QL NAA+PROBE: NEGATIVE
SARS-COV-2 RNA RESP QL NAA+PROBE: NEGATIVE
SODIUM SERPL-SCNC: 140 MEQ/L (ref 136–145)
SP GR UR REFRACT.AUTO: 1.02
TROPONIN I SERPL HS-MCNC: <2 PG/ML
UROBILINOGEN UR STRIP-ACNC: 0.2 EU/DL
WBC # BLD AUTO: 6.68 K/UL (ref 3.8–10.5)

## 2024-01-04 PROCEDURE — 96361 HYDRATE IV INFUSION ADD-ON: CPT

## 2024-01-04 PROCEDURE — 96375 TX/PRO/DX INJ NEW DRUG ADDON: CPT

## 2024-01-04 PROCEDURE — 3E0337Z INTRODUCTION OF ELECTROLYTIC AND WATER BALANCE SUBSTANCE INTO PERIPHERAL VEIN, PERCUTANEOUS APPROACH: ICD-10-PCS | Performed by: EMERGENCY MEDICINE

## 2024-01-04 PROCEDURE — 93005 ELECTROCARDIOGRAM TRACING: CPT

## 2024-01-04 PROCEDURE — 63600000 HC DRUGS/DETAIL CODE: Performed by: STUDENT IN AN ORGANIZED HEALTH CARE EDUCATION/TRAINING PROGRAM

## 2024-01-04 PROCEDURE — 25800000 HC PHARMACY IV SOLUTIONS: Performed by: STUDENT IN AN ORGANIZED HEALTH CARE EDUCATION/TRAINING PROGRAM

## 2024-01-04 PROCEDURE — 3E033GC INTRODUCTION OF OTHER THERAPEUTIC SUBSTANCE INTO PERIPHERAL VEIN, PERCUTANEOUS APPROACH: ICD-10-PCS | Performed by: EMERGENCY MEDICINE

## 2024-01-04 PROCEDURE — 99284 EMERGENCY DEPT VISIT MOD MDM: CPT | Mod: 25,U5

## 2024-01-04 PROCEDURE — 81003 URINALYSIS AUTO W/O SCOPE: CPT | Performed by: STUDENT IN AN ORGANIZED HEALTH CARE EDUCATION/TRAINING PROGRAM

## 2024-01-04 PROCEDURE — G1004 CDSM NDSC: HCPCS

## 2024-01-04 PROCEDURE — 84484 ASSAY OF TROPONIN QUANT: CPT

## 2024-01-04 PROCEDURE — 80053 COMPREHEN METABOLIC PANEL: CPT

## 2024-01-04 PROCEDURE — 71045 X-RAY EXAM CHEST 1 VIEW: CPT

## 2024-01-04 PROCEDURE — 87637 SARSCOV2&INF A&B&RSV AMP PRB: CPT

## 2024-01-04 PROCEDURE — 85025 COMPLETE CBC W/AUTO DIFF WBC: CPT

## 2024-01-04 PROCEDURE — 96374 THER/PROPH/DIAG INJ IV PUSH: CPT

## 2024-01-04 PROCEDURE — 3E0333Z INTRODUCTION OF ANTI-INFLAMMATORY INTO PERIPHERAL VEIN, PERCUTANEOUS APPROACH: ICD-10-PCS | Performed by: EMERGENCY MEDICINE

## 2024-01-04 PROCEDURE — 36415 COLL VENOUS BLD VENIPUNCTURE: CPT

## 2024-01-04 RX ORDER — DICYCLOMINE HYDROCHLORIDE 20 MG/1
20 TABLET ORAL 2 TIMES DAILY
Qty: 60 TABLET | Refills: 0 | Status: SHIPPED | OUTPATIENT
Start: 2024-01-04 | End: 2024-01-04 | Stop reason: ENTERED-IN-ERROR

## 2024-01-04 RX ORDER — KETOROLAC TROMETHAMINE 15 MG/ML
15 INJECTION, SOLUTION INTRAMUSCULAR; INTRAVENOUS ONCE
Status: COMPLETED | OUTPATIENT
Start: 2024-01-04 | End: 2024-01-04

## 2024-01-04 RX ORDER — ONDANSETRON 4 MG/1
4 TABLET, ORALLY DISINTEGRATING ORAL EVERY 8 HOURS PRN
Qty: 21 TABLET | Refills: 0 | Status: SHIPPED | OUTPATIENT
Start: 2024-01-04 | End: 2024-01-11

## 2024-01-04 RX ORDER — FLUTICASONE PROPIONATE 50 MCG
2 SPRAY, SUSPENSION (ML) NASAL DAILY
COMMUNITY
Start: 2023-11-21

## 2024-01-04 RX ORDER — ONDANSETRON HYDROCHLORIDE 2 MG/ML
4 INJECTION, SOLUTION INTRAVENOUS ONCE
Status: COMPLETED | OUTPATIENT
Start: 2024-01-04 | End: 2024-01-04

## 2024-01-04 RX ORDER — PREGABALIN 100 MG/1
100 CAPSULE ORAL
COMMUNITY
Start: 2023-12-19

## 2024-01-04 RX ADMIN — SODIUM CHLORIDE 1000 ML: 9 INJECTION, SOLUTION INTRAVENOUS at 11:43

## 2024-01-04 RX ADMIN — KETOROLAC TROMETHAMINE 15 MG: 15 INJECTION, SOLUTION INTRAMUSCULAR; INTRAVENOUS at 11:44

## 2024-01-04 RX ADMIN — ONDANSETRON 4 MG: 2 INJECTION INTRAMUSCULAR; INTRAVENOUS at 11:44

## 2024-01-04 ASSESSMENT — ENCOUNTER SYMPTOMS
FEVER: 0
DIFFICULTY URINATING: 0
WEAKNESS: 0
LIGHT-HEADEDNESS: 1
CHEST TIGHTNESS: 0
DIARRHEA: 0
NAUSEA: 1
ABDOMINAL DISTENTION: 0
SHORTNESS OF BREATH: 0
HEMATURIA: 0
ABDOMINAL PAIN: 1
FATIGUE: 0
RECTAL PAIN: 0
HEADACHES: 1
CONSTIPATION: 0
BACK PAIN: 1
FLANK PAIN: 1
ARTHRALGIAS: 0
MYALGIAS: 0
JOINT SWELLING: 0
DYSURIA: 0
VOMITING: 0
DIZZINESS: 1
ACTIVITY CHANGE: 0
APPETITE CHANGE: 0

## 2024-01-04 NOTE — Clinical Note
Sofia Glass was seen and treated in our emergency department on 1/4/2024.  Sofia Glass may return to work on 01/08/2024.       If you have any questions or concerns, please don't hesitate to call.      Dami Moeller PA C

## 2024-01-04 NOTE — DISCHARGE INSTRUCTIONS
You were seen in the emergency department today for evaluation of abdominal pain.  Your lab work and imaging today are reassuring.  We believe this is most likely musculoskeletal in nature.  Please take Advil and Tylenol as needed for pain.  Please take Zofran as needed for nausea.  Please closely follow-up with a GI specialist outpatient for further evaluation and management.    Please return to the ED with any new or worsening symptoms

## 2024-01-04 NOTE — ED PROVIDER NOTES
Emergency Medicine Note  HPI   HISTORY OF PRESENT ILLNESS     The patient is a 53-year-old female with history of anxiety, asthma, depression, hyperlipidemia, sleep apnea, panic attacks presenting to the emergency department for evaluation of right thoracic back pain with radiation to the right flank and right side of the abdomen x 1 week.  [facility] and was diagnosed with a viral GI bug.  Her pain has worsened has become more persistent which is why she is in the ED today.  She also notes episode of hypotension earlier this morning when she checked it at 4 AM blood pressure was 82/49.  Triage blood pressure here is 125/80 however.  Patient notes some mild nausea but denies fevers.  She also notes lightheadedness and dizziness earlier this morning with improvement.  She also notes a history of kidney stones and states this feels similar to her kidney stones in the past.  She denies change in bladder or bowel habits, chest pain, shortness of breath, syncope, palpitations.            Patient History   PAST HISTORY     Reviewed from Nursing Triage:       Past Medical History:   Diagnosis Date   • Anxiety    • Asthma     due to anxiety   • Depression    • Incontinence of urine in female    • Lipid disorder    • Panic attacks    • Pneumonia    • Sleep apnea        Past Surgical History:   Procedure Laterality Date   • APPENDECTOMY     •  SECTION      x3   • CHOLECYSTECTOMY         History reviewed. No pertinent family history.    Social History     Tobacco Use   • Smoking status: Never   • Smokeless tobacco: Never   Vaping Use   • Vaping Use: Never used   Substance Use Topics   • Alcohol use: Yes   • Drug use: Never         Review of Systems   REVIEW OF SYSTEMS     Review of Systems   Constitutional: Negative for activity change, appetite change, fatigue and fever.   Respiratory: Negative for chest tightness and shortness of breath.    Gastrointestinal: Positive for abdominal pain and nausea.  Negative for abdominal distention, constipation, diarrhea, rectal pain and vomiting.   Genitourinary: Positive for flank pain. Negative for difficulty urinating, dysuria, hematuria and urgency.   Musculoskeletal: Positive for back pain. Negative for arthralgias, joint swelling and myalgias.   Neurological: Positive for dizziness, light-headedness and headaches. Negative for syncope and weakness.         VITALS     ED Vitals    Date/Time Temp Pulse Resp BP SpO2 [facility]   01/04/24 1314 36.6 °C (97.8 °F) 60 18 127/65 98 % MT   01/04/24 0940 36.5 °C (97.7 °F) 63 18 135/80 96 % KK        Pulse Ox %: 96 % (01/04/24 1136)  Pulse Ox Interpretation: Normal (01/04/24 1136)  Heart Rate: 63 (01/04/24 1136)  Rhythm Strip Interpretation: Normal Sinus Rhythm (01/04/24 1136)     Physical Exam   PHYSICAL EXAM     Physical Exam  Vitals reviewed.   Constitutional:       Appearance: Normal appearance. She is obese.   HENT:      Head: Normocephalic and atraumatic.      Mouth/Throat:      Mouth: Mucous membranes are dry.      Pharynx: Oropharynx is clear.   Eyes:      Extraocular Movements: Extraocular movements intact.      Conjunctiva/sclera: Conjunctivae normal.   Cardiovascular:      Rate and Rhythm: Normal rate and regular rhythm.      Pulses: Normal pulses.      Heart sounds: Normal heart sounds.   Pulmonary:      Effort: Pulmonary effort is normal.      Breath sounds: Normal breath sounds.   Abdominal:      General: Abdomen is flat. There is no distension.      Tenderness: There is abdominal tenderness in the right lower quadrant. There is right CVA tenderness. There is no left CVA tenderness or guarding. Negative signs include Butt's sign and McBurney's sign.      Comments: The abdomen is soft and nondistended.  She has mild tenderness to palpation at the right lower quadrant, right flank tenderness without rebound or guarding.  Negative Butt sign, negative McBurney's point tenderness.  She did have some mild right CVA tenderness    Musculoskeletal:         General: Normal range of motion.      Cervical back: Normal range of motion.   Skin:     General: Skin is warm and dry.   Neurological:      General: No focal deficit present.      Mental Status: She is alert and oriented to person, place, and time.   Psychiatric:         Mood and Affect: Mood normal.         Behavior: Behavior normal.         Thought Content: Thought content normal.         Judgment: Judgment normal.           PROCEDURES     Procedures     DATA     Results     Procedure Component Value Units Date/Time    UA w/ reflex culture (ED Only) [[id number]]  (Abnormal) Collected: 01/04/24 1313    Specimen: Urine, Clean Catch Updated: 01/04/24 1339    Narrative:      The following orders were created for panel order UA w/ reflex culture (ED Only).  Procedure                               Abnormality         Status                     ---------                               -----------         ------                     UA Reflex to Culture (Ma...[[id number]]  Abnormal            Final result                 Please view results for these tests on the individual orders.    UA Reflex to Culture (Macroscopic) [[id number]]  (Abnormal) Collected: 01/04/24 1313    Specimen: Urine, Clean Catch Updated: 01/04/24 1339     Color, Urine Yellow     Clarity, Urine Clear     Specific Gravity, Urine 1.020     pH, Urine 6.5     Leukocyte Esterase Negative     Comment: Results can be falsely negative due to high specific gravity, some antibiotics, glucose >3 g/dl, or WBC other than neutrophils.        Nitrite, Urine Negative     Protein, Urine Negative     Glucose, Urine Negative mg/dL      Ketones, Urine Trace mg/dL      Comment: Free sulfhydryl drugs such as Mesna, Capoten, and Acetylcysteine (Mucomyst) may cause false positive ketonuria.        Urobilinogen, Urine 0.2 EU/dL      Bilirubin, Urine Negative mg/dL      Blood, Urine Negative     Comment: The sensitivity of the occult blood test is  equivalent to approximately 4 intact RBC/HPF.       SARS-CoV-2 (COVID-19) Nasopharynx [[id number]]  (Normal) Collected: 01/04/24 0944    Specimen: Nasopharyngeal Swab from Nasopharynx Updated: 01/04/24 1040    Narrative:      The following orders were created for panel order SARS-CoV-2 (COVID-19) Nasopharynx.  Procedure                               Abnormality         Status                     ---------                               -----------         ------                     SARS-COV-2 (COVID-19)/ F...[[id number]]  Normal              Final result                 Please view results for these tests on the individual orders.    SARS-COV-2 (COVID-19)/ FLU A/B, AND RSV, PCR Nasopharynx [[id number]]  (Normal) Collected: 01/04/24 0944    Specimen: Nasopharyngeal Swab from Nasopharynx Updated: 01/04/24 1040     SARS-CoV-2 (COVID-19) Negative     Influenza A Negative     Influenza B Negative     Respiratory Syncytial Virus Negative    Narrative:      Testing performed using real-time PCR for detection of COVID-19. EUA approved validation studies performed on site.     HS Troponin I (with 2 hour reflex) [[id number]]  (Normal) Collected: 01/04/24 0950    Specimen: Blood, Venous Updated: 01/04/24 1034     High Sens Troponin I <2.0 pg/mL     Comprehensive metabolic panel [[id number]]  (Abnormal) Collected: 01/04/24 0950    Specimen: Blood, Venous Updated: 01/04/24 1032     Sodium 140 mEQ/L      Potassium 3.9 mEQ/L      Comment: Results obtained on plasma. Plasma Potassium values may be up to 0.4 mEQ/L less than serum values. The differences may be greater for patients with high platelet or white cell counts.        Chloride 105 mEQ/L      CO2 27 mEQ/L      BUN 11 mg/dL      Creatinine 0.5 mg/dL      Glucose 107 mg/dL      Calcium 9.8 mg/dL      AST (SGOT) 26 IU/L      ALT (SGPT) 22 IU/L      Alkaline Phosphatase 62 IU/L      Total Protein 8.0 g/dL      Comment: Test performed on plasma which typically contains approximately  0.4 g/dL more protein than serum.        Albumin 4.8 g/dL      Bilirubin, Total 1.2 mg/dL      eGFR >60.0 mL/min/1.73m*2      Comment: Calculation based on the Chronic Kidney Disease Epidemiology Collaboration (CKD-EPI) equation refit without adjustment for race.        Anion Gap 8 mEQ/L     CBC and differential [[id number]] Collected: 01/04/24 0950    Specimen: Blood, Venous Updated: 01/04/24 1004     WBC 6.68 K/uL      RBC 4.79 M/uL      Hemoglobin 14.2 g/dL      Hematocrit 42.3 %      MCV 88.3 fL      MCH 29.6 pg      MCHC 33.6 g/dL      RDW 13.0 %      Platelets 307 K/uL      MPV 9.8 fL      Differential Type Auto     nRBC 0.0 %      Immature Granulocytes 0.1 %      Neutrophils 62.6 %      Lymphocytes 28.6 %      Monocytes 6.7 %      Eosinophils 1.0 %      Basophils 1.0 %      Immature Granulocytes, Absolute 0.01 K/uL      Neutrophils, Absolute 4.17 K/uL      Lymphocytes, Absolute 1.91 K/uL      Monocytes, Absolute 0.45 K/uL      Eosinophils, Absolute 0.07 K/uL      Basophils, Absolute 0.07 K/uL     RAINBOW RED [[id number]] Collected: 01/04/24 0950    Specimen: Blood, Venous Updated: 01/04/24 0955    RAINBOW GOLD [[id number]] Collected: 01/04/24 0950    Specimen: Blood, Venous Updated: 01/04/24 0955    [place] Draw Panel [[id number]] Collected: 01/04/24 0950    Specimen: Blood, Venous Updated: 01/04/24 0955    Narrative:      The following orders were created for panel order [place] Draw Panel.  Procedure                               Abnormality         Status                     ---------                               -----------         ------                     RAINBOW RED[[id number]]                                      In process                 RAINBOW LT BLUE[[id number]]                                  In process                 RAINBOW GOLD[[id number]]                                     In process                   Please view results for these tests on the individual orders.    RAINBOW LT BLUE [[id number]]  Collected: 01/04/24 0950    Specimen: Blood, Venous Updated: 01/04/24 0955          Imaging Results          CT ABDOMEN PELVIS WITHOUT IV CONTRAST (Final result)  Result time 01/04/24 11:52:56    Final result                 Impression:    IMPRESSION:  No acute abnormality in the abdomen or pelvis.                 Narrative:    CLINICAL HISTORY:  Flank pain, kidney stone suspected    Comparison: CT dated 2/2/2023, 2/7/2020, and other studies    TECHNIQUE: Multidetector CT of the abdomen and pelvis was performed without  intravenous contrast.  Oral contrast was not administered. Reformatted axial,  sagittal and coronal images were reviewed. Dose reduction techniques such as  automated exposure control were used in this study where applicable.    COMMENT:  There is a 3 mm pleural-based nodule in the right middle lobe. No further  follow-up is indicated. The heart is normal in size. There is a small hiatal  hernia.    The liver, pancreas, spleen, adrenal glands and kidneys are unremarkable. The  gallbladder surgically absent.    No aortic aneurysm.    No findings of bowel obstruction. Colonic diverticulosis without evidence of  acute diverticulitis. The appendix is not clearly identified.  However, there  are no pericecal inflammatory changes to suggest acute appendicitis.    No large volume ascites, intra-abdominal abscess or pneumoperitoneum. No  lymphadenopathy by size criteria.    The urinary bladder is unremarkable. No suspicious pelvic masses.    Mild degenerative changes in the spine and hips.                               X-RAY CHEST 1 VIEW (Final result)  Result time 01/04/24 10:15:47    Final result                 Impression:    IMPRESSION: No acute cardiopulmonary process.             Narrative:    CLINICAL HISTORY: Shortness of breath and cough    COMMENT:  PA erect view of the chest demonstrates the cardiovascular silhouette  to be within normal limits.  The costophrenic angles are clear.  There is  no  focal lung consolidation or pneumothorax.                                ECG 12 lead   Independent Interpretation by ED Provider   ECG  Rate: 70  Rhythm: sinus  SC Interval: 154  QRS Duration: 78  QT/QTc: 404/436  ST segment: no acute ischemic changes      Impression: NSR              Scoring tools                                  ED Course & MDM   MDM / ED COURSE / CLINICAL IMPRESSION / DISPO     Medical Decision Making  The patient is a 53-year-old female presenting to the emergency department for evaluation of right thoracic back pain with radiation to the right flank and right abdomen x 1 week.  The patient notes she was in a fair amount of pain earlier this morning, at the time she checked her blood pressure was fine to be hypertensive 80s/40s.  After discussion with patient we believe this is most likely vagal.  On exam the patient did have some tenderness to palpation at the right thoracic paravertebral musculature, right flank and right abdomen.  Mild CVA tenderness.  Patient well-appearing otherwise. Vital signs stable.  Highest on differential diagnosis was a kidney stone as she states she has a history of this and feels similar to her past episodes.  CT was warranted, patient has a contrast allergy and we discussed this with her and we are proceeding with a CT Noncon.  Lab work today reassuring with negative UA, negative troponin x 1, normal electrolytes, normal white count.  Chest x-ray was ordered at triage which did not reveal acute abnormality.  CT of the abdomen pelvis also did not reveal acute abnormality.  Patient was started on fluids, Toradol, Zofran with mild improvement in symptoms.  Recommended she closely follow-up with GI outpatient for further evaluation and management.  She expressed understanding and agreement with this plan was discharged.  Return precaution addressed    Amount and/or Complexity of Data Reviewed  Labs: ordered. Decision-making details documented in ED  Course.  Radiology: ordered.  ECG/medicine tests: independent interpretation performed.      Risk  Prescription drug management.          ED Course as of 01/04/24 2238   u Jan 04, 2024   1133 Impression: Right thoracic back pain radiation to the right flank and right abdomen with associated hypotension prior to arrival concerning for UTI, pyelonephritis, intra-abdominal infection, ureterolithiasis, musculoskeletal injury, gastritis    Plan: Labs, troponin, EKG, viral panel, UA, chest x-ray, CT abdomen pelvis, fluids, Zofran, analgesia, eval [MO]   1134 Discussed with Dr. Lovelace [MO]   1136 High Sens Troponin I: <2.0 [MO]   1136 Sodium: 140 [MO]   1136 WBC: 6.68 [MO]   1136 SARS-CoV-2 (COVID-19): Negative [MO]   1136 IMPRESSION: No acute cardiopulmonary process [MO]   1353 Leukocyte Esterase: Negative [MO]   1353 Nitrite, Urine: Negative [MO]      ED Course User Index  [MO] Dami Moeller PA C     Clinical Impression      Abdominal pain, unspecified abdominal location     _________________     ED Disposition   Discharge                   Dami Moeller PA C  01/04/24 2238

## 2024-01-05 LAB
ATRIAL RATE: 70
P AXIS: 35
PR INTERVAL: 154
QRS DURATION: 78
QT INTERVAL: 404
QTC CALCULATION(BAZETT): 436
R AXIS: 40
T WAVE AXIS: 22
VENTRICULAR RATE: 70

## 2024-01-05 NOTE — ED ATTESTATION NOTE
I have personally seen and examined the patient.  I have performed the key components of the encounter and provided a substantive portion of the care and medical decision making for the patient.     I reviewed and agree with resident / physician assistant / nurse practitioner’s assessment and plan of care, with any exceptions as documented below.     My focused history, examination, assessment, and plan of care of  Sofia Glass is as follows:       Vital Signs Review: Vital signs have been reviewed. The oxygen saturation is  SpO2: 96 % which is  normal.    The patient is a 53-year-old female with past medical history of pneumonia, sleep apnea, anxiety, lipid disorder, who presents to the emergency department for evaluation of right flank pain.  The patient was seen at an urgent care center recently and diagnosed with a viral gastrointestinal illness.  The patient's pain has been present for approximately a week.  The patient's pain is worse with walking and movement.  The patient's blood pressure was noted to be hypotensive this morning.  The patient denies fever, chills, cough, chest pain, dyspnea, vomiting, dysuria, frequency or urgency.  The patient has had mild nausea.  Patient does report history of previous kidney stones.        Physical exam: Vital signs reviewed.  General: Patient appears comfortable sitting up in chair.  HEENT: NCAT, EOMI, MMM.  Neck: Supple, nontender, full range of motion.  Chest: Lungs clear.  Heart: Regular.  No murmurs.  Abdomen: Soft, mild tenderness in the right upper quadrant, right flank and right lower quadrant, no guarding, no rebound, no pulsatile mass, normal active bowel sounds.  Femoral pulses equal bilaterally. Extremities: No cyanosis, clubbing, edema, or extremity tenderness.  Skin: Warm and dry, no rash.  Neuro: GCS 15.  Affect: Normal.      Plan: Check labs, CT scan rule out acute infectious, phonatory or obstructive process.    Labs Reviewed   COMPREHENSIVE  METABOLIC PANEL - Abnormal       Result Value    Sodium 140      Potassium 3.9      Chloride 105      CO2 27      BUN 11      Creatinine 0.5 (*)     Glucose 107 (*)     Calcium 9.8      AST (SGOT) 26      ALT (SGPT) 22      Alkaline Phosphatase 62      Total Protein 8.0      Albumin 4.8      Bilirubin, Total 1.2      eGFR >60.0      Anion Gap 8     UA REFLEX CULTURE (MACROSCOPIC) - Abnormal    Color, Urine Yellow      Clarity, Urine Clear      Specific Gravity, Urine 1.020      pH, Urine 6.5      Leukocyte Esterase Negative      Nitrite, Urine Negative      Protein, Urine Negative      Glucose, Urine Negative      Ketones, Urine Trace (*)     Urobilinogen, Urine 0.2      Bilirubin, Urine Negative      Blood, Urine Negative     SARS-COV-2 (COVID-19)/ FLU A/B, AND RSV, PCR - Normal    SARS-CoV-2 (COVID-19) Negative      Influenza A Negative      Influenza B Negative      Respiratory Syncytial Virus Negative      Narrative:     Testing performed using real-time PCR for detection of COVID-19. EUA approved validation studies performed on site.    HIGH SENSITIVE TROPONIN I (BASELINE - REFLEX 2HR) - Normal    High Sens Troponin I <2.0     SARS-COV-2 (COVID 19), PCR    Narrative:     The following orders were created for panel order SARS-CoV-2 (COVID-19) Nasopharynx.  Procedure                               Abnormality         Status                     ---------                               -----------         ------                     SARS-COV-2 (COVID-19)/ F...[[id number]]  Normal              Final result                 Please view results for these tests on the individual orders.   CBC AND DIFF    WBC 6.68      RBC 4.79      Hemoglobin 14.2      Hematocrit 42.3      MCV 88.3      MCH 29.6      MCHC 33.6      RDW 13.0      Platelets 307      MPV 9.8      Differential Type Auto      nRBC 0.0      Immature Granulocytes 0.1      Neutrophils 62.6      Lymphocytes 28.6      Monocytes 6.7      Eosinophils 1.0      Basophils  1.0      Immature Granulocytes, Absolute 0.01      Neutrophils, Absolute 4.17      Lymphocytes, Absolute 1.91      Monocytes, Absolute 0.45      Eosinophils, Absolute 0.07      Basophils, Absolute 0.07     URINALYSIS REFLEX CULTURE (ED AND OUTPATIENT ONLY)    Narrative:     The following orders were created for panel order UA w/ reflex culture (ED Only).  Procedure                               Abnormality         Status                     ---------                               -----------         ------                     UA Reflex to Culture (Ma...[[id number]]  Abnormal            Final result                 Please view results for these tests on the individual orders.   RAINBOW DRAW PANEL    Narrative:     The following orders were created for panel order [place] Draw Panel.  Procedure                               Abnormality         Status                     ---------                               -----------         ------                     RAINBOW RED[[id number]]                                      In process                 RAINBOW LT BLUE[[id number]]                                  In process                 RAINBOW GOLD[[id number]]                                     In process                   Please view results for these tests on the individual orders.   RAINBOW RED   RAINBOW LT BLUE   RAINBOW GOLD     CT ABDOMEN PELVIS WITHOUT IV CONTRAST   Final Result   IMPRESSION:   No acute abnormality in the abdomen or pelvis.            X-RAY CHEST 1 VIEW   Final Result   IMPRESSION: No acute cardiopulmonary process.      ECG 12 lead   ED Interpretation   ECG  Rate: 70  Rhythm: sinus  [place] Interval: 154  QRS Duration: 78  QT/QTc: 404/436  ST segment: no acute ischemic changes      Impression: NSR                        MDM: Patient test results reviewed.  No evidence of acute obstructive process.  No appendicitis.  Plan discharge patient.  Unclear cause of patient's symptoms.  The patient given strict return  precautions.      Impression: Acute right flank pain.       I was physically present for the key/critical portions of the following procedures: None    This document was created using Dragon dictation software.  There may be some typographical errors due to this technology.     Carlos Lovelace MD  01/05/24 1522

## 2024-04-16 ENCOUNTER — HOSPITAL ENCOUNTER (EMERGENCY)
Facility: HOSPITAL | Age: 54
Discharge: HOME | End: 2024-04-17
Attending: EMERGENCY MEDICINE
Payer: COMMERCIAL

## 2024-04-16 DIAGNOSIS — R10.9 LEFT FLANK PAIN: Primary | ICD-10-CM

## 2024-04-16 DIAGNOSIS — R30.0 DYSURIA: ICD-10-CM

## 2024-04-16 DIAGNOSIS — R10.30 LOWER ABDOMINAL PAIN: ICD-10-CM

## 2024-04-16 PROCEDURE — 36415 COLL VENOUS BLD VENIPUNCTURE: CPT

## 2024-04-16 PROCEDURE — 96374 THER/PROPH/DIAG INJ IV PUSH: CPT

## 2024-04-16 PROCEDURE — 85025 COMPLETE CBC W/AUTO DIFF WBC: CPT

## 2024-04-16 PROCEDURE — 80053 COMPREHEN METABOLIC PANEL: CPT

## 2024-04-16 PROCEDURE — 99284 EMERGENCY DEPT VISIT MOD MDM: CPT | Mod: 25,U5

## 2024-04-16 PROCEDURE — 80053 COMPREHEN METABOLIC PANEL: CPT | Performed by: EMERGENCY MEDICINE

## 2024-04-16 PROCEDURE — 84703 CHORIONIC GONADOTROPIN ASSAY: CPT

## 2024-04-16 PROCEDURE — 85025 COMPLETE CBC W/AUTO DIFF WBC: CPT | Performed by: EMERGENCY MEDICINE

## 2024-04-17 ENCOUNTER — APPOINTMENT (EMERGENCY)
Dept: RADIOLOGY | Facility: HOSPITAL | Age: 54
End: 2024-04-17
Payer: COMMERCIAL

## 2024-04-17 VITALS
SYSTOLIC BLOOD PRESSURE: 124 MMHG | BODY MASS INDEX: 34.93 KG/M2 | TEMPERATURE: 97.2 F | WEIGHT: 185 LBS | DIASTOLIC BLOOD PRESSURE: 54 MMHG | OXYGEN SATURATION: 98 % | HEIGHT: 61 IN | HEART RATE: 59 BPM | RESPIRATION RATE: 18 BRPM

## 2024-04-17 LAB
ALBUMIN SERPL-MCNC: 4.7 G/DL (ref 3.5–5.7)
ALP SERPL-CCNC: 66 IU/L (ref 34–125)
ALT SERPL-CCNC: 19 IU/L (ref 7–52)
ANION GAP SERPL CALC-SCNC: 7 MEQ/L (ref 3–15)
AST SERPL-CCNC: 33 IU/L (ref 13–39)
BACTERIA URNS QL MICRO: ABNORMAL /HPF
BASOPHILS # BLD: 0.09 K/UL (ref 0.01–0.1)
BASOPHILS NFR BLD: 1.1 %
BILIRUB SERPL-MCNC: 0.7 MG/DL (ref 0.3–1.2)
BILIRUB UR QL STRIP.AUTO: NEGATIVE MG/DL
BUN SERPL-MCNC: 15 MG/DL (ref 7–25)
CALCIUM SERPL-MCNC: 9.5 MG/DL (ref 8.6–10.3)
CHLORIDE SERPL-SCNC: 104 MEQ/L (ref 98–107)
CLARITY UR REFRACT.AUTO: CLEAR
CO2 SERPL-SCNC: 28 MEQ/L (ref 21–31)
COLOR UR AUTO: YELLOW
CREAT SERPL-MCNC: 0.5 MG/DL (ref 0.6–1.2)
DIFFERENTIAL METHOD BLD: NORMAL
EGFRCR SERPLBLD CKD-EPI 2021: >60 ML/MIN/1.73M*2
EOSINOPHIL # BLD: 0.09 K/UL (ref 0.04–0.36)
EOSINOPHIL NFR BLD: 1.1 %
ERYTHROCYTE [DISTWIDTH] IN BLOOD BY AUTOMATED COUNT: 12.9 % (ref 11.7–14.4)
GLUCOSE SERPL-MCNC: 107 MG/DL (ref 70–99)
GLUCOSE UR STRIP.AUTO-MCNC: NEGATIVE MG/DL
HCG UR QL: NEGATIVE
HCT VFR BLD AUTO: 39.9 % (ref 35–45)
HGB BLD-MCNC: 13.4 G/DL (ref 11.8–15.7)
HGB UR QL STRIP.AUTO: 1
HYALINE CASTS #/AREA URNS LPF: ABNORMAL /LPF
IMM GRANULOCYTES # BLD AUTO: 0.08 K/UL (ref 0–0.08)
IMM GRANULOCYTES NFR BLD AUTO: 1 %
KETONES UR STRIP.AUTO-MCNC: NEGATIVE MG/DL
LEUKOCYTE ESTERASE UR QL STRIP.AUTO: ABNORMAL
LYMPHOCYTES # BLD: 3.11 K/UL (ref 1.2–3.5)
LYMPHOCYTES NFR BLD: 37.3 %
MCH RBC QN AUTO: 29.5 PG (ref 28–33.2)
MCHC RBC AUTO-ENTMCNC: 33.6 G/DL (ref 32.2–35.5)
MCV RBC AUTO: 87.9 FL (ref 83–98)
MONOCYTES # BLD: 0.68 K/UL (ref 0.28–0.8)
MONOCYTES NFR BLD: 8.2 %
MUCOUS THREADS URNS QL MICRO: ABNORMAL /LPF
NEUTROPHILS # BLD: 4.28 K/UL (ref 1.7–7)
NEUTS SEG NFR BLD: 51.3 %
NITRITE UR QL STRIP.AUTO: NEGATIVE
NRBC BLD-RTO: 0 %
PDW BLD AUTO: 9.4 FL (ref 9.4–12.3)
PH UR STRIP.AUTO: 7 [PH]
PLATELET # BLD AUTO: 364 K/UL (ref 150–369)
POTASSIUM SERPL-SCNC: 4.1 MEQ/L (ref 3.5–5.1)
PROT SERPL-MCNC: 7.4 G/DL (ref 6–8.2)
PROT UR QL STRIP.AUTO: NEGATIVE
RBC # BLD AUTO: 4.54 M/UL (ref 3.93–5.22)
RBC #/AREA URNS HPF: ABNORMAL /HPF
SODIUM SERPL-SCNC: 139 MEQ/L (ref 136–145)
SP GR UR REFRACT.AUTO: 1.02
SQUAMOUS URNS QL MICRO: ABNORMAL /HPF
UROBILINOGEN UR STRIP-ACNC: 0.2 EU/DL
WBC # BLD AUTO: 8.33 K/UL (ref 3.8–10.5)
WBC #/AREA URNS HPF: ABNORMAL /HPF

## 2024-04-17 PROCEDURE — 81003 URINALYSIS AUTO W/O SCOPE: CPT

## 2024-04-17 PROCEDURE — 81001 URINALYSIS AUTO W/SCOPE: CPT | Performed by: EMERGENCY MEDICINE

## 2024-04-17 PROCEDURE — 63600000 HC DRUGS/DETAIL CODE: Mod: JZ

## 2024-04-17 PROCEDURE — 74176 CT ABD & PELVIS W/O CONTRAST: CPT

## 2024-04-17 RX ORDER — KETOROLAC TROMETHAMINE 30 MG/ML
30 INJECTION, SOLUTION INTRAMUSCULAR; INTRAVENOUS ONCE
Status: COMPLETED | OUTPATIENT
Start: 2024-04-17 | End: 2024-04-17

## 2024-04-17 RX ORDER — ONDANSETRON 4 MG/1
4 TABLET, ORALLY DISINTEGRATING ORAL EVERY 8 HOURS PRN
Qty: 15 TABLET | Refills: 0 | Status: SHIPPED | OUTPATIENT
Start: 2024-04-17 | End: 2024-04-22

## 2024-04-17 RX ADMIN — KETOROLAC TROMETHAMINE 30 MG: 30 INJECTION, SOLUTION INTRAMUSCULAR; INTRAVENOUS at 01:54

## 2024-04-17 ASSESSMENT — ENCOUNTER SYMPTOMS
FATIGUE: 0
FEVER: 0
WEAKNESS: 0
SHORTNESS OF BREATH: 0
LIGHT-HEADEDNESS: 0
DIZZINESS: 0
SORE THROAT: 0
ARTHRALGIAS: 0
VOMITING: 0
ABDOMINAL DISTENTION: 0
CHILLS: 0
COLOR CHANGE: 0
BACK PAIN: 0
FLANK PAIN: 1
HEADACHES: 0
DIFFICULTY URINATING: 1
DIAPHORESIS: 0
NAUSEA: 0
EYE PAIN: 0
CONSTIPATION: 0
DIARRHEA: 0
HEMATURIA: 0
PALPITATIONS: 0
SEIZURES: 0
DYSURIA: 1
CONFUSION: 0
COUGH: 0
FREQUENCY: 0
ABDOMINAL PAIN: 1

## 2024-04-17 NOTE — DISCHARGE INSTRUCTIONS
You were evaluated in the emergency department for urinary symptoms, flank pain, and abdominal pain. Your evaluation did not show evidence of medical conditions requiring emergent intervention at this time.  Please schedule an appointment with your primary care physician within 2 days for reevaluation.  Return to the emergency department if you experience worsening or uncontrolled pain, fevers 100.4 or greater, recurrent vomiting, inability to tolerate food or fluids by mouth, bloody stools or vomit, dark or tarry stools, or any other concerning symptoms.

## 2024-04-17 NOTE — ED PROVIDER NOTES
HPI   HISTORY OF PRESENT ILLNESS     Patient is a 54-year-old female with a past medical history of anxiety, asthma, depression, lip disorder, panic attacks, and sleep apnea who arrives to the emergency department for lower abdominal pain, urinary retention, dysuria, and left flank pain x 7 days.  Patient reports recent treatment for UTI.  Patient states she finished her antibiotic treatment.  No other associated symptoms.  No treatments prior to arrival.  Patient ports pain is a constant, throbbing, 10 out of 10 pain with no radiation or aggravating factors.  Patient denies chest pain, shortness of breath, palpitations, cough, fever, chills, headache, vision changes, diaphoresis, lightheadedness, dizziness, numbness, tingling, nausea, vomiting, diarrhea, or constipation.        History provided by:  Patient  Abdominal Pain  Associated symptoms: dysuria    Associated symptoms: no chest pain, no chills, no constipation, no cough, no diarrhea, no fatigue, no fever, no hematuria, no nausea, no shortness of breath, no sore throat, no vaginal bleeding, no vaginal discharge and no vomiting          Patient History   PAST HISTORY     Reviewed from Nursing Triage:       Past Medical History:   Diagnosis Date    Anxiety     Asthma     due to anxiety    Depression     Incontinence of urine in female     Lipid disorder     Panic attacks     Pneumonia     Sleep apnea        Past Surgical History:   Procedure Laterality Date    APPENDECTOMY       SECTION      x3    CHOLECYSTECTOMY         No family history on file.    Social History     Tobacco Use    Smoking status: Never    Smokeless tobacco: Never   Vaping Use    Vaping Use: Never used   Substance Use Topics    Alcohol use: Yes    Drug use: Never         Review of Systems   REVIEW OF SYSTEMS     Review of Systems   Constitutional:  Negative for chills, diaphoresis, fatigue and fever.   HENT:  Negative for ear pain and sore throat.    Eyes:  Negative for pain and  visual disturbance.   Respiratory:  Negative for cough and shortness of breath.    Cardiovascular:  Negative for chest pain and palpitations.   Gastrointestinal:  Positive for abdominal pain (Lower abdominal pain). Negative for abdominal distention, constipation, diarrhea, nausea and vomiting.   Genitourinary:  Positive for difficulty urinating, dysuria and flank pain (Left flank pain). Negative for decreased urine volume, frequency, hematuria, pelvic pain, urgency, vaginal bleeding, vaginal discharge and vaginal pain.   Musculoskeletal:  Negative for arthralgias, back pain and gait problem.   Skin:  Negative for color change and rash.   Neurological:  Negative for dizziness, seizures, syncope, weakness, light-headedness and headaches.   Psychiatric/Behavioral:  Negative for confusion.    All other systems reviewed and are negative.        VITALS     ED Vitals      Date/Time Temp Pulse Resp BP SpO2 [facility]   04/17/24 0201 -- 54 16 133/60 99 % AP   04/17/24 0022 -- 50 16 109/58 96 % AP   04/16/24 2258 36.4 °C (97.6 °F) 77 16 149/75 97 % AG          Pulse Ox %: 97 % (04/17/24 0011)  Pulse Ox Interpretation: Normal (04/17/24 0011)  Heart Rate: 77 (04/17/24 0011)  Rhythm Strip Interpretation: Normal Sinus Rhythm (04/17/24 0011)     Physical Exam   PHYSICAL EXAM     Physical Exam  Vitals and nursing note reviewed.   Constitutional:       General: She is not in acute distress.     Appearance: She is well-developed. She is not ill-appearing, toxic-appearing or diaphoretic.   HENT:      Head: Normocephalic and atraumatic.      Nose: No congestion or rhinorrhea.      Mouth/Throat:      Mouth: Mucous membranes are moist.   Eyes:      Extraocular Movements: Extraocular movements intact.      Conjunctiva/sclera: Conjunctivae normal.      Pupils: Pupils are equal, round, and reactive to light.   Cardiovascular:      Rate and Rhythm: Normal rate and regular rhythm.      Heart sounds: Normal heart sounds.   Pulmonary:      Effort:  Pulmonary effort is normal. No respiratory distress.      Breath sounds: Normal breath sounds.   Abdominal:      General: Abdomen is protuberant. Bowel sounds are normal.      Palpations: Abdomen is soft.      Tenderness: There is no abdominal tenderness. There is no right CVA tenderness, left CVA tenderness or guarding. Negative signs include Butt's sign and McBurney's sign.   Musculoskeletal:         General: No tenderness.      Right lower leg: No edema.      Left lower leg: No edema.   Skin:     General: Skin is warm and dry.      Capillary Refill: Capillary refill takes less than 2 seconds.   Neurological:      General: No focal deficit present.      Mental Status: She is alert and oriented to person, place, and time.   Psychiatric:         Mood and Affect: Mood normal.         Behavior: Behavior normal.           PROCEDURES     Procedures     DATA     Results       Procedure Component Value Units Date/Time    Urinalysis with Reflex Culture [[id number]]  (Abnormal) Collected: 04/17/24 0017    Specimen: Urine, Clean Catch Updated: 04/17/24 0101    Narrative:      The following orders were created for panel order Urinalysis with Reflex Culture.  Procedure                               Abnormality         Status                     ---------                               -----------         ------                     UA Reflex to Culture (Ma...[[id number]]  Abnormal            Final result               UA Microscopic[[id number]]               Abnormal            Final result                 Please view results for these tests on the individual orders.    UA Microscopic [[id number]]  (Abnormal) Collected: 04/17/24 0017    Specimen: Urine, Clean Catch Updated: 04/17/24 0101     RBC, Urine 20 TO 35 /HPF      WBC, Urine 0 TO 3 /HPF      Squamous Epithelial Rare /hpf      Hyaline Cast None Seen /lpf      Bacteria, Urine None Seen /HPF      Mucus Rare /LPF     BhCG, Serum, Qual [[id number]]  (Normal) Collected: 04/16/24  2321    Specimen: Blood, Venous Updated: 04/17/24 0049     Preg Test, Serum Negative    UA Reflex to Culture (Macroscopic) [[id number]]  (Abnormal) Collected: 04/17/24 0017    Specimen: Urine, Clean Catch Updated: 04/17/24 0031     Color, Urine Yellow     Clarity, Urine Clear     Specific Gravity, Urine 1.022     pH, Urine 7.0     Leukocyte Esterase Trace     Nitrite, Urine Negative     Protein, Urine Negative     Glucose, Urine Negative mg/dL      Ketones, Urine Negative mg/dL      Urobilinogen, Urine 0.2 EU/dL      Bilirubin, Urine Negative mg/dL      Blood, Urine +1     Comment: The sensitivity of the occult blood test is equivalent to approximately 4 intact RBC/HPF.       CBC and differential [[id number]] Collected: 04/16/24 2321    Specimen: Blood, Venous Updated: 04/17/24 0010     WBC 8.33 K/uL      RBC 4.54 M/uL      Hemoglobin 13.4 g/dL      Hematocrit 39.9 %      MCV 87.9 fL      MCH 29.5 pg      MCHC 33.6 g/dL      RDW 12.9 %      Platelets 364 K/uL      MPV 9.4 fL      Differential Type Auto     nRBC 0.0 %      Immature Granulocytes 1.0 %      Neutrophils 51.3 %      Lymphocytes 37.3 %      Monocytes 8.2 %      Eosinophils 1.1 %      Basophils 1.1 %      Immature Granulocytes, Absolute 0.08 K/uL      Neutrophils, Absolute 4.28 K/uL      Lymphocytes, Absolute 3.11 K/uL      Monocytes, Absolute 0.68 K/uL      Eosinophils, Absolute 0.09 K/uL      Basophils, Absolute 0.09 K/uL     Comprehensive metabolic panel [[id number]]  (Abnormal) Collected: 04/16/24 2321    Specimen: Blood, Venous Updated: 04/17/24 0003     Sodium 139 mEQ/L      Comment: Moderate hemolysis, results may be affected.         Potassium 4.1 mEQ/L      Comment: Results obtained on plasma. Plasma Potassium values may be up to 0.4 mEQ/L less than serum values. The differences may be greater for patients with high platelet or white cell counts.  Moderate hemolysis, results may be affected.         Chloride 104 mEQ/L      CO2 28 mEQ/L      BUN 15  mg/dL      Creatinine 0.5 mg/dL      Glucose 107 mg/dL      Calcium 9.5 mg/dL      AST (SGOT) 33 IU/L      Comment: Moderate hemolysis, results may be affected.         ALT (SGPT) 19 IU/L      Alkaline Phosphatase 66 IU/L      Total Protein 7.4 g/dL      Comment: Test performed on plasma which typically contains approximately 0.4 g/dL more protein than serum.        Albumin 4.7 g/dL      Bilirubin, Total 0.7 mg/dL      eGFR >60.0 mL/min/1.73m*2      Comment: Calculation based on the Chronic Kidney Disease Epidemiology Collaboration (CKD-EPI) equation refit without adjustment for race.        Anion Gap 7 mEQ/L     [place] Draw Panel [[id number]] Collected: 04/16/24 2321    Specimen: Blood, Venous Updated: 04/16/24 2333    Narrative:      The following orders were created for panel order [place] Draw Panel.  Procedure                               Abnormality         Status                     ---------                               -----------         ------                     RAINBOW RED[[id number]]                                      In process                 RAINBOW LT BLUE[[id number]]                                  In process                 RAINBOW GOLD[[id number]]                                     In process                 RAINBOW LT GREEN[[id number]]                                 In process                   Please view results for these tests on the individual orders.    RAINBOW RED [[id number]] Collected: 04/16/24 2321    Specimen: Blood, Venous Updated: 04/16/24 2333    RAINBOW LT BLUE [[id number]] Collected: 04/16/24 2321    Specimen: Blood, Venous Updated: 04/16/24 2333    RAINBOW GOLD [[id number]] Collected: 04/16/24 2321    Specimen: Blood, Venous Updated: 04/16/24 2333    RAINBOW LT GREEN [[id number]] Collected: 04/16/24 2321    Specimen: Blood, Venous Updated: 04/16/24 2333            Imaging Results              CT ABDOMEN PELVIS WITHOUT IV CONTRAST (In process)                     No orders to  display       Scoring tools                                  ED Course & MDM   MDM / ED COURSE / CLINICAL IMPRESSION / DISPO     Medical Decision Making  Impression: Patient is alert and oriented.  Patient presents to the ED for lower abdominal pain, urinary retention, dysuria, and left flank pain x 7 days.  Patient reports recent treatment for UTI.  Patient states she finished her antibiotic treatment.  No other associated symptoms.  No treatments prior to arrival.  Exam suspicious for UTI versus pyelonephritis versus renal colic.  Plan to obtain laboratory studies, UA, and CT ab/pelvis.  PERRLA.  Lungs are clear.  Negative CVA tenderness.  Bowel sounds present all 4 quadrants.  Abdomen soft and nontender with palpation.  Patient is afebrile.  VSS with an SpO2 of 99% on room air.  Trace leukocytes, + blood, RBC 20-35.  CT ab/pelvis Preliminary Impression: No urinary tract calculus or hydronephrosis.  Plan: CBC, CMP, bHCG, UA, CT ab/pelvis, pain management, reassess  Dispo: Discharge home with strict return precautions.  Follow-up with PCP within 2 days for reevaluation.  Diagnosis and management discussed with patient who understood and is comfortable with discharge.    Problems Addressed:  Dysuria: acute illness or injury  Left flank pain: acute illness or injury  Lower abdominal pain: acute illness or injury    Amount and/or Complexity of Data Reviewed  Labs: ordered. Decision-making details documented in ED Course.  Radiology: ordered and independent interpretation performed.    Risk  Prescription drug management.        Vital Signs Review: Vital signs have been reviewed. The oxygen saturation is SpO2: 97 % which is normal.    ED Course as of 04/17/24 0521   Wed Apr 17, 2024   0004 Patient evaluated for lower abdominal pain, urinary retention, dysuria, and left flank pain x 7 days.  Patient reports recent treatment for UTI.  Patient states she finished her antibiotic treatment.  No other associated symptoms.  No  treatments prior to arrival. [JG]   0145 RBC, Urine(!): 20 TO 35 [JG]   0145 Blood, Urine(!): +1 [JG]   0145 Leukocyte Esterase(!): Trace [JG]   0506 Patient Name: Sofia Glass  Exam(s): a/p stone CT  MR#: [id number]       History: : 2 weeks ago lower abd pain had UTI took abx. for the past 7 days still having pain and sx. pthaving urinary retention and burning. pain radiating into left flank and blood in urine    Preliminary Impression:    No urinary tract calculus or hydronephrosis.    Interpreted by: Tam Shukla MD, Apr 17, 2024 04:47 AM     [JG]   0521 Diagnosis and management discussed with patient who understood and is comfortable with discharge plan. [JG]      ED Course User Index  [JG] Kavon Vergara CRNP     Clinical Impression      None               Kavon Vergara CRNP  04/17/24 0513       Kavon Vergara CRNP  04/17/24 0522

## 2024-04-21 NOTE — ED ATTESTATION NOTE
I have personally seen and examined Sofia Glass.  I was involved in the care and medical decision making for this patient.    I reviewed and agree with physician assistant / nurse practitioner’s assessment and plan of care; any exceptions are documented below.      My focused history, examination, assessment and plan of care of Sofia Glass is as follows:    Brief History:  HPI  55 yo F with hx of anxiety, asthma, HLD, BOUBACAR p/w LLQ abd pain and L flank pain over the past 1 week. Denies any nausea/ vomiting. No CP or SOB. Recently completed a course of Abx for UTI. Also reports some vaginal itching though denies any vaginal d/c or rash. No fevers. + mild dysuria.        Focused Physical Exam:  Physical Exam  Vitals and nursing note reviewed.   Constitutional:       General: She is in acute distress.      Appearance: She is well-developed and normal weight. She is not toxic-appearing.   HENT:      Head: Normocephalic.      Mouth/Throat:      Mouth: Mucous membranes are moist.   Eyes:      Extraocular Movements: Extraocular movements intact.      Pupils: Pupils are equal, round, and reactive to light.   Cardiovascular:      Rate and Rhythm: Normal rate and regular rhythm.      Heart sounds: Normal heart sounds.   Pulmonary:      Effort: Pulmonary effort is normal.      Breath sounds: Normal breath sounds.   Abdominal:      General: Bowel sounds are normal. There is no distension.      Palpations: Abdomen is soft.      Tenderness: There is abdominal tenderness in the left lower quadrant. There is no guarding or rebound.      Hernia: No hernia is present.   Skin:     General: Skin is warm and dry.      Capillary Refill: Capillary refill takes less than 2 seconds.      Findings: No rash.   Neurological:      General: No focal deficit present.      Mental Status: She is alert and oriented to person, place, and time.      Cranial Nerves: No cranial nerve deficit.      Motor: No weakness.   Psychiatric:          Mood and Affect: Mood normal.         Behavior: Behavior normal.             Assessment / Plan:        Medical Decision Making  Suspect possible ureteral stone, UTI/ pyelo, less likely diverticulitis, AAA, abscess/ perforation, ovarian cyst, malignancy. Plan labs, including UA, CT abd/pelvis and will reassess.     Amount and/or Complexity of Data Reviewed  Labs: ordered. Decision-making details documented in ED Course.  Radiology: ordered and independent interpretation performed.      Risk  Prescription drug management.          I was physically present for the key/critical portions of the following procedures:  Procedures           Dragan Severino MD  04/21/24 [id number]

## 2024-11-12 ENCOUNTER — APPOINTMENT (RX ONLY)
Dept: URBAN - METROPOLITAN AREA CLINIC 374 | Facility: CLINIC | Age: 54
Setting detail: DERMATOLOGY
End: 2024-11-12

## 2024-11-12 DIAGNOSIS — L71.8 OTHER ROSACEA: ICD-10-CM

## 2024-11-12 PROCEDURE — ? PRESCRIPTION

## 2024-11-12 PROCEDURE — ? ADDITIONAL NOTES

## 2024-11-12 PROCEDURE — ? TREATMENT REGIMEN

## 2024-11-12 PROCEDURE — ? COUNSELING

## 2024-11-12 PROCEDURE — ? PHOTO-DOCUMENTATION

## 2024-11-12 PROCEDURE — ? PRESCRIPTION MEDICATION MANAGEMENT

## 2024-11-12 PROCEDURE — ? TREATMENT GOALS

## 2024-11-12 PROCEDURE — 99204 OFFICE O/P NEW MOD 45 MIN: CPT

## 2024-11-12 RX ORDER — METRONIDAZOLE 7.5 MG/G
GEL TOPICAL QHS
Qty: 45 | Refills: 3 | Status: ERX | COMMUNITY
Start: 2024-11-12

## 2024-11-12 RX ADMIN — METRONIDAZOLE: 7.5 GEL TOPICAL at 00:00

## 2024-11-12 ASSESSMENT — LOCATION ZONE DERM: LOCATION ZONE: FACE

## 2024-11-12 ASSESSMENT — LOCATION DETAILED DESCRIPTION DERM
LOCATION DETAILED: LEFT CENTRAL MALAR CHEEK
LOCATION DETAILED: RIGHT CENTRAL MALAR CHEEK

## 2024-11-12 ASSESSMENT — SEVERITY ASSESSMENT OVERALL AMONG ALL PATIENTS: IN YOUR EXPERIENCE, AMONG ALL PATIENTS YOU HAVE SEEN WITH THIS CONDITION, HOW SEVERE IS THIS PATIENT'S CONDITION?: MILD

## 2024-11-12 ASSESSMENT — LOCATION SIMPLE DESCRIPTION DERM
LOCATION SIMPLE: LEFT CHEEK
LOCATION SIMPLE: RIGHT CHEEK

## 2024-11-12 NOTE — PROCEDURE: ADDITIONAL NOTES
Detail Level: Detailed
Render Risk Assessment In Note?: no
Additional Notes: Laser therapy referral (Dr. Fabián Kaur)

## 2024-11-12 NOTE — PROCEDURE: PRESCRIPTION MEDICATION MANAGEMENT
Initiate Treatment: metronidazole 0.75 % topical gel Qhs: Apply a thin layer to face QHS
Render In Strict Bullet Format?: No
Detail Level: Zone

## (undated) DEVICE — APPLICATORS COTTON TIPPED

## (undated) DEVICE — GLOVE SZ 8 LINER PROTEXIS PI BL

## (undated) DEVICE — DRAPE STERI TOWEL PLASTIC 18X24

## (undated) DEVICE — CUFF TOURNIQUET DISP 18 X 4

## (undated) DEVICE — SYRINGE DISP LUER-LOK 10 CC

## (undated) DEVICE — CAST PADDING 4IN

## (undated) DEVICE — SOLN IRRIG .9%SOD 500ML

## (undated) DEVICE — NEEDLE DISP HYPO 25GX1-1/2IN

## (undated) DEVICE — BANDAGE CONFORM 4IN STERILE

## (undated) DEVICE — SLEEVE SCD COMFORT KNEE LENGTH MED

## (undated) DEVICE — GLOVE PROTEXIS PI ORTHO 8.0

## (undated) DEVICE — BLADE SCALPEL #15

## (undated) DEVICE — DEFOGGER ENDOMATE

## (undated) DEVICE — GOWN SIRUS FABRNF RAGLAN XL ST 28/CS

## (undated) DEVICE — Device

## (undated) DEVICE — SUTURE BIOSYN 4-0 UNDYED 1X18 P-12

## (undated) DEVICE — PAD GROUND ELECTROSURGICAL W/CORD

## (undated) DEVICE — APPLICATOR CHLORAPREP 10.5ML ORANGE TINT

## (undated) DEVICE — COVER LIGHTHANDLE (STERILE SINGLE PA

## (undated) DEVICE — SUTURE MONOSOF 4-0 BLACK 1X18 P-12

## (undated) DEVICE — BLADE AM SURGICAL MOUNTABLE ENDOSCOPIC

## (undated) DEVICE — GAUZE 8X4 16 PLY RFID DOUBLE XRAY

## (undated) DEVICE — TOWEL SURGICAL W17XL27IN BLUE COTTON STANDARD PREWASHED DELI

## (undated) DEVICE — BLANKET UPPER BODY BAIR HUGGER

## (undated) DEVICE — MANIFOLD SINGLE PORT NEPTUNE

## (undated) DEVICE — PACK RFID HAND

## (undated) DEVICE — NEEDLE HYPODERMIC PRO 18G X 1 1/2 IN

## (undated) DEVICE — APPLICATOR CHLORAPREP 26ML ORANGE TINT

## (undated) DEVICE — DRESSING SPONGE ALL GAUZE 4X4 STER 10PK

## (undated) DEVICE — DRESSING ADAPTIC STERILE 3X3